# Patient Record
Sex: FEMALE | Race: WHITE | NOT HISPANIC OR LATINO | Employment: FULL TIME | URBAN - NONMETROPOLITAN AREA
[De-identification: names, ages, dates, MRNs, and addresses within clinical notes are randomized per-mention and may not be internally consistent; named-entity substitution may affect disease eponyms.]

---

## 2017-06-02 ENCOUNTER — HOSPITAL ENCOUNTER (INPATIENT)
Facility: HOSPITAL | Age: 57
LOS: 1 days | Discharge: HOME | End: 2017-06-03
Attending: FAMILY MEDICINE | Admitting: FAMILY MEDICINE

## 2017-06-02 ENCOUNTER — APPOINTMENT (OUTPATIENT)
Dept: CT IMAGING | Facility: HOSPITAL | Age: 57
End: 2017-06-02

## 2017-06-02 ENCOUNTER — APPOINTMENT (OUTPATIENT)
Dept: GENERAL RADIOLOGY | Facility: HOSPITAL | Age: 57
End: 2017-06-02

## 2017-06-02 PROBLEM — T40.2X1A OPIOID OVERDOSE (HCC): Status: ACTIVE | Noted: 2017-06-02

## 2017-06-02 LAB
ACETONE BLD QL: NEGATIVE
ALBUMIN SERPL-MCNC: 3.7 G/DL (ref 3.5–5)
ALBUMIN/GLOB SERPL: 1.2 G/DL (ref 1.1–2.5)
ALP SERPL-CCNC: 102 U/L (ref 24–120)
ALT SERPL W P-5'-P-CCNC: 30 U/L (ref 0–54)
ANION GAP SERPL CALCULATED.3IONS-SCNC: 11 MMOL/L (ref 4–13)
APAP SERPL-MCNC: <10 MCG/ML (ref 10–30)
AST SERPL-CCNC: 24 U/L (ref 7–45)
BASOPHILS # BLD AUTO: 0.03 10*3/MM3 (ref 0–0.2)
BASOPHILS NFR BLD AUTO: 0.3 % (ref 0–2)
BILIRUB SERPL-MCNC: 0.5 MG/DL (ref 0.1–1)
BUN BLD-MCNC: 13 MG/DL (ref 5–21)
BUN/CREAT SERPL: 23.6 (ref 7–25)
CALCIUM SPEC-SCNC: 8.9 MG/DL (ref 8.4–10.4)
CHLORIDE SERPL-SCNC: 103 MMOL/L (ref 98–110)
CO2 SERPL-SCNC: 28 MMOL/L (ref 24–31)
CREAT BLD-MCNC: 0.55 MG/DL (ref 0.5–1.4)
D-LACTATE SERPL-SCNC: 1.1 MMOL/L (ref 0.5–2)
DEPRECATED RDW RBC AUTO: 41.5 FL (ref 40–54)
EOSINOPHIL # BLD AUTO: 0.06 10*3/MM3 (ref 0–0.7)
EOSINOPHIL NFR BLD AUTO: 0.5 % (ref 0–4)
ERYTHROCYTE [DISTWIDTH] IN BLOOD BY AUTOMATED COUNT: 12.8 % (ref 12–15)
GFR SERPL CREATININE-BSD FRML MDRD: 114 ML/MIN/1.73
GLOBULIN UR ELPH-MCNC: 3.2 GM/DL
GLUCOSE BLD-MCNC: 204 MG/DL (ref 70–100)
GLUCOSE BLDC GLUCOMTR-MCNC: 194 MG/DL (ref 70–130)
HCT VFR BLD AUTO: 39.3 % (ref 37–47)
HGB BLD-MCNC: 13.4 G/DL (ref 12–16)
IMM GRANULOCYTES # BLD: 0.02 10*3/MM3 (ref 0–0.03)
IMM GRANULOCYTES NFR BLD: 0.2 % (ref 0–5)
LYMPHOCYTES # BLD AUTO: 2.83 10*3/MM3 (ref 0.72–4.86)
LYMPHOCYTES NFR BLD AUTO: 25.7 % (ref 15–45)
MCH RBC QN AUTO: 30.7 PG (ref 28–32)
MCHC RBC AUTO-ENTMCNC: 34.1 G/DL (ref 33–36)
MCV RBC AUTO: 89.9 FL (ref 82–98)
MONOCYTES # BLD AUTO: 0.48 10*3/MM3 (ref 0.19–1.3)
MONOCYTES NFR BLD AUTO: 4.4 % (ref 4–12)
NEUTROPHILS # BLD AUTO: 7.6 10*3/MM3 (ref 1.87–8.4)
NEUTROPHILS NFR BLD AUTO: 68.9 % (ref 39–78)
PLATELET # BLD AUTO: 217 10*3/MM3 (ref 130–400)
PMV BLD AUTO: 11.4 FL (ref 6–12)
POTASSIUM BLD-SCNC: 3.8 MMOL/L (ref 3.5–5.3)
PROT SERPL-MCNC: 6.9 G/DL (ref 6.3–8.7)
RBC # BLD AUTO: 4.37 10*6/MM3 (ref 4.2–5.4)
SALICYLATES SERPL-MCNC: <1 MG/DL (ref 15–30)
SODIUM BLD-SCNC: 142 MMOL/L (ref 135–145)
TROPONIN I SERPL-MCNC: 0.03 NG/ML (ref 0–0.03)
WBC NRBC COR # BLD: 11.02 10*3/MM3 (ref 4.8–10.8)

## 2017-06-02 PROCEDURE — 80053 COMPREHEN METABOLIC PANEL: CPT | Performed by: INTERNAL MEDICINE

## 2017-06-02 PROCEDURE — 63710000001 INSULIN LISPRO (HUMAN) PER 5 UNITS: Performed by: INTERNAL MEDICINE

## 2017-06-02 PROCEDURE — 83036 HEMOGLOBIN GLYCOSYLATED A1C: CPT | Performed by: INTERNAL MEDICINE

## 2017-06-02 PROCEDURE — 80307 DRUG TEST PRSMV CHEM ANLYZR: CPT | Performed by: INTERNAL MEDICINE

## 2017-06-02 PROCEDURE — 25010000002 HYDRALAZINE PER 20 MG: Performed by: INTERNAL MEDICINE

## 2017-06-02 PROCEDURE — 83605 ASSAY OF LACTIC ACID: CPT | Performed by: INTERNAL MEDICINE

## 2017-06-02 PROCEDURE — 72125 CT NECK SPINE W/O DYE: CPT

## 2017-06-02 PROCEDURE — 82962 GLUCOSE BLOOD TEST: CPT

## 2017-06-02 PROCEDURE — 84484 ASSAY OF TROPONIN QUANT: CPT | Performed by: INTERNAL MEDICINE

## 2017-06-02 PROCEDURE — 71010 HC CHEST PA OR AP: CPT

## 2017-06-02 PROCEDURE — 70450 CT HEAD/BRAIN W/O DYE: CPT

## 2017-06-02 PROCEDURE — 85025 COMPLETE CBC W/AUTO DIFF WBC: CPT | Performed by: INTERNAL MEDICINE

## 2017-06-02 PROCEDURE — 82009 KETONE BODYS QUAL: CPT | Performed by: INTERNAL MEDICINE

## 2017-06-02 RX ORDER — SODIUM CHLORIDE 9 MG/ML
150 INJECTION, SOLUTION INTRAVENOUS CONTINUOUS
Status: DISCONTINUED | OUTPATIENT
Start: 2017-06-02 | End: 2017-06-03

## 2017-06-02 RX ORDER — NICOTINE POLACRILEX 4 MG
15 LOZENGE BUCCAL
Status: DISCONTINUED | OUTPATIENT
Start: 2017-06-02 | End: 2017-06-03 | Stop reason: HOSPADM

## 2017-06-02 RX ORDER — DEXTROSE MONOHYDRATE 25 G/50ML
25 INJECTION, SOLUTION INTRAVENOUS
Status: DISCONTINUED | OUTPATIENT
Start: 2017-06-02 | End: 2017-06-03 | Stop reason: HOSPADM

## 2017-06-02 RX ORDER — ONDANSETRON 2 MG/ML
4 INJECTION INTRAMUSCULAR; INTRAVENOUS EVERY 6 HOURS PRN
Status: DISCONTINUED | OUTPATIENT
Start: 2017-06-02 | End: 2017-06-03 | Stop reason: HOSPADM

## 2017-06-02 RX ORDER — PANTOPRAZOLE SODIUM 40 MG/10ML
40 INJECTION, POWDER, LYOPHILIZED, FOR SOLUTION INTRAVENOUS
Status: DISCONTINUED | OUTPATIENT
Start: 2017-06-03 | End: 2017-06-03

## 2017-06-02 RX ORDER — NALOXONE HYDROCHLORIDE 1 MG/ML
1 INJECTION INTRAMUSCULAR; INTRAVENOUS; SUBCUTANEOUS AS NEEDED
Status: DISCONTINUED | OUTPATIENT
Start: 2017-06-02 | End: 2017-06-03 | Stop reason: HOSPADM

## 2017-06-02 RX ORDER — HYDRALAZINE HYDROCHLORIDE 20 MG/ML
10 INJECTION INTRAMUSCULAR; INTRAVENOUS EVERY 4 HOURS PRN
Status: DISCONTINUED | OUTPATIENT
Start: 2017-06-02 | End: 2017-06-03 | Stop reason: HOSPADM

## 2017-06-02 RX ADMIN — HYDRALAZINE HYDROCHLORIDE 10 MG: 20 INJECTION INTRAMUSCULAR; INTRAVENOUS at 22:52

## 2017-06-02 RX ADMIN — INSULIN LISPRO 2 UNITS: 100 INJECTION, SOLUTION INTRAVENOUS; SUBCUTANEOUS at 23:02

## 2017-06-02 RX ADMIN — SODIUM CHLORIDE 150 ML/HR: 9 INJECTION, SOLUTION INTRAVENOUS at 22:52

## 2017-06-03 VITALS
DIASTOLIC BLOOD PRESSURE: 52 MMHG | OXYGEN SATURATION: 96 % | TEMPERATURE: 98 F | HEIGHT: 65 IN | HEART RATE: 63 BPM | SYSTOLIC BLOOD PRESSURE: 153 MMHG | RESPIRATION RATE: 16 BRPM | BODY MASS INDEX: 33.15 KG/M2 | WEIGHT: 199 LBS

## 2017-06-03 PROBLEM — E11.9 TYPE II DIABETES MELLITUS (HCC): Status: ACTIVE | Noted: 2017-06-03

## 2017-06-03 PROBLEM — I10 ESSENTIAL HYPERTENSION: Status: ACTIVE | Noted: 2017-06-03

## 2017-06-03 LAB
AMPHET+METHAMPHET UR QL: NEGATIVE
BARBITURATES UR QL SCN: NEGATIVE
BENZODIAZ UR QL SCN: NEGATIVE
BILIRUB UR QL STRIP: NEGATIVE
CANNABINOIDS SERPL QL: NEGATIVE
CLARITY UR: CLEAR
COCAINE UR QL: NEGATIVE
COLOR UR: YELLOW
GLUCOSE BLDC GLUCOMTR-MCNC: 244 MG/DL (ref 70–130)
GLUCOSE BLDC GLUCOMTR-MCNC: 287 MG/DL (ref 70–130)
GLUCOSE UR STRIP-MCNC: ABNORMAL MG/DL
HBA1C MFR BLD: 9.3 %
HGB UR QL STRIP.AUTO: NEGATIVE
KETONES UR QL STRIP: NEGATIVE
LEUKOCYTE ESTERASE UR QL STRIP.AUTO: NEGATIVE
METHADONE UR QL SCN: NEGATIVE
NITRITE UR QL STRIP: NEGATIVE
OPIATES UR QL: NEGATIVE
PCP UR QL SCN: NEGATIVE
PH UR STRIP.AUTO: 7 [PH] (ref 5–8)
PROT UR QL STRIP: NEGATIVE
SP GR UR STRIP: 1.02 (ref 1–1.03)
TROPONIN I SERPL-MCNC: 0.02 NG/ML (ref 0–0.03)
TROPONIN I SERPL-MCNC: 0.03 NG/ML (ref 0–0.03)
UROBILINOGEN UR QL STRIP: ABNORMAL

## 2017-06-03 PROCEDURE — 80307 DRUG TEST PRSMV CHEM ANLYZR: CPT | Performed by: INTERNAL MEDICINE

## 2017-06-03 PROCEDURE — 63710000001 INSULIN LISPRO (HUMAN) PER 5 UNITS: Performed by: INTERNAL MEDICINE

## 2017-06-03 PROCEDURE — 93005 ELECTROCARDIOGRAM TRACING: CPT | Performed by: FAMILY MEDICINE

## 2017-06-03 PROCEDURE — 25010000002 HYDRALAZINE PER 20 MG: Performed by: INTERNAL MEDICINE

## 2017-06-03 PROCEDURE — 82962 GLUCOSE BLOOD TEST: CPT

## 2017-06-03 PROCEDURE — 81003 URINALYSIS AUTO W/O SCOPE: CPT | Performed by: INTERNAL MEDICINE

## 2017-06-03 PROCEDURE — 93010 ELECTROCARDIOGRAM REPORT: CPT | Performed by: INTERNAL MEDICINE

## 2017-06-03 PROCEDURE — 25010000002 THIAMINE PER 100 MG: Performed by: INTERNAL MEDICINE

## 2017-06-03 PROCEDURE — 25010000002 ENOXAPARIN PER 10 MG: Performed by: FAMILY MEDICINE

## 2017-06-03 PROCEDURE — 84484 ASSAY OF TROPONIN QUANT: CPT | Performed by: INTERNAL MEDICINE

## 2017-06-03 PROCEDURE — 84484 ASSAY OF TROPONIN QUANT: CPT | Performed by: FAMILY MEDICINE

## 2017-06-03 RX ORDER — NITROGLYCERIN 0.4 MG/1
0.4 TABLET SUBLINGUAL
Status: DISCONTINUED | OUTPATIENT
Start: 2017-06-03 | End: 2017-06-03 | Stop reason: HOSPADM

## 2017-06-03 RX ORDER — PANTOPRAZOLE SODIUM 40 MG/1
40 TABLET, DELAYED RELEASE ORAL
Status: DISCONTINUED | OUTPATIENT
Start: 2017-06-04 | End: 2017-06-03 | Stop reason: HOSPADM

## 2017-06-03 RX ORDER — NITROGLYCERIN 0.4 MG/1
TABLET SUBLINGUAL
Status: COMPLETED
Start: 2017-06-03 | End: 2017-06-03

## 2017-06-03 RX ORDER — ACETAMINOPHEN 325 MG/1
650 TABLET ORAL EVERY 6 HOURS PRN
Status: DISCONTINUED | OUTPATIENT
Start: 2017-06-03 | End: 2017-06-03 | Stop reason: HOSPADM

## 2017-06-03 RX ORDER — METOPROLOL TARTRATE 50 MG/1
50 TABLET, FILM COATED ORAL EVERY 12 HOURS SCHEDULED
Qty: 60 TABLET | Refills: 0 | Status: SHIPPED | OUTPATIENT
Start: 2017-06-03

## 2017-06-03 RX ORDER — METOPROLOL TARTRATE 50 MG/1
50 TABLET, FILM COATED ORAL EVERY 12 HOURS SCHEDULED
Status: DISCONTINUED | OUTPATIENT
Start: 2017-06-03 | End: 2017-06-03 | Stop reason: HOSPADM

## 2017-06-03 RX ORDER — KETOROLAC TROMETHAMINE 30 MG/ML
30 INJECTION, SOLUTION INTRAMUSCULAR; INTRAVENOUS EVERY 6 HOURS PRN
Status: DISCONTINUED | OUTPATIENT
Start: 2017-06-03 | End: 2017-06-03 | Stop reason: HOSPADM

## 2017-06-03 RX ADMIN — SODIUM CHLORIDE 150 ML/HR: 9 INJECTION, SOLUTION INTRAVENOUS at 05:50

## 2017-06-03 RX ADMIN — ENOXAPARIN SODIUM 40 MG: 40 INJECTION SUBCUTANEOUS at 10:35

## 2017-06-03 RX ADMIN — NITROGLYCERIN: 0.4 TABLET SUBLINGUAL at 10:09

## 2017-06-03 RX ADMIN — HYDRALAZINE HYDROCHLORIDE 10 MG: 20 INJECTION INTRAMUSCULAR; INTRAVENOUS at 07:55

## 2017-06-03 RX ADMIN — INSULIN LISPRO 3 UNITS: 100 INJECTION, SOLUTION INTRAVENOUS; SUBCUTANEOUS at 08:08

## 2017-06-03 RX ADMIN — THIAMINE HYDROCHLORIDE 100 MG: 100 INJECTION, SOLUTION INTRAMUSCULAR; INTRAVENOUS at 08:00

## 2017-06-03 RX ADMIN — INSULIN LISPRO 4 UNITS: 100 INJECTION, SOLUTION INTRAVENOUS; SUBCUTANEOUS at 12:02

## 2017-06-03 RX ADMIN — NICARDIPINE HYDROCHLORIDE 5 MG/HR: 25 INJECTION INTRAVENOUS at 04:05

## 2017-06-03 RX ADMIN — PANTOPRAZOLE SODIUM 40 MG: 40 INJECTION, POWDER, FOR SOLUTION INTRAVENOUS at 05:24

## 2017-06-03 RX ADMIN — METFORMIN HYDROCHLORIDE 500 MG: 500 TABLET ORAL at 10:48

## 2017-06-03 RX ADMIN — ACETAMINOPHEN 650 MG: 325 TABLET ORAL at 15:19

## 2017-06-03 RX ADMIN — METOPROLOL TARTRATE 50 MG: 50 TABLET ORAL at 10:35

## 2017-06-03 RX ADMIN — FOLIC ACID 1 MG: 5 INJECTION, SOLUTION INTRAMUSCULAR; INTRAVENOUS; SUBCUTANEOUS at 08:01

## 2017-06-03 NOTE — PLAN OF CARE
Problem: Patient Care Overview (Adult)  Goal: Plan of Care Review  Outcome: Ongoing (interventions implemented as appropriate)    06/03/17 1303   Coping/Psychosocial Response Interventions   Plan Of Care Reviewed With patient;family   Patient Care Overview   Progress no change   Outcome Evaluation   Outcome Summary/Follow up Plan Cardiac/Consistent carbohydrate edu provided. cont to follow

## 2017-06-03 NOTE — PROGRESS NOTES
"    [facility] Medicine Services  INPATIENT PROGRESS NOTE    Length of Stay: 1  Date of Admission: 6/2/2017  Primary Care Physician: No Known Provider    Subjective     Chief Complaint:     Medication overdose    HPI     The patient is alert, oriented and talkative this morning.  She has been complaining of some anterior chest discomfort today.  She was given 1 nitroglycerin sublingual without relief.  A stat 12-lead EKG was ordered as well as serial cardiac enzymes.  She remains on a Cardene drip and her blood pressure is improved.  I have started her on metoprolol 50 mg twice a day and hopefully we can wean the Cardene drip and transfer the patient to the floor.  Hemoglobin A1c is elevated consistent with diabetes mellitus and I will start Glucophage 500 mg by mouth twice a day.  She states that she has been hypertensive for a great number of years but has not been treated or seen a physician for treatment.  The patient and I had long discussion regarding medication compliance, hypertension and diabetes.  Patient states that she \"does not like doctors\" and as a result has not had medical care in a number of years.      Review of Systems   Constitutional: Negative.    HENT: Negative.    Eyes: Negative.    Respiratory: Negative.    Cardiovascular: Positive for chest pain.   Gastrointestinal: Negative.    Endocrine: Negative.    Genitourinary: Negative.    Musculoskeletal: Positive for back pain.   Skin: Negative.    Allergic/Immunologic: Negative.    Neurological: Negative.    Hematological: Negative.    Psychiatric/Behavioral: Negative.         All pertinent negatives and positives are as above. All other systems have been reviewed and are negative unless otherwise stated.     Objective    Temp:  [97.5 °F (36.4 °C)-99.5 °F (37.5 °C)] 98.4 °F (36.9 °C)  Heart Rate:  [69-97] 84  Resp:  [15-22] 15  BP: (135-216)/() 142/77     Specimen:  Blood Updated:  06/02/17 2201     WBC 11.02 " (H) 10*3/mm3      RBC 4.37 10*6/mm3      Hemoglobin 13.4 g/dL      Hematocrit 39.3 %      MCV 89.9 fL      MCH 30.7 pg      MCHC 34.1 g/dL      RDW 12.8 %      RDW-SD 41.5 fl      MPV 11.4 fL      Platelets 217 10*3/mm3      Neutrophil % 68.9 %      Lymphocyte % 25.7 %      Monocyte % 4.4 %      Eosinophil % 0.5 %      Basophil % 0.3 %      Immature Grans % 0.2 %      Neutrophils, Absolute 7.60 10*3/mm3      Lymphocytes, Absolute 2.83 10*3/mm3      Monocytes, Absolute 0.48 10*3/mm3      Eosinophils, Absolute 0.06 10*3/mm3      Basophils, Absolute 0.03 10*3/mm3      Immature Grans, Absolute 0.02 10*3/mm3     Acetone [[id number]]  (Normal) Collected:  06/02/17 2151    Specimen:  Blood Updated:  06/02/17 2208     Acetone Negative    Lactic Acid, Plasma [[id number]]  (Normal) Collected:  06/02/17 2151    Specimen:  Blood Updated:  06/02/17 2212     Lactate 1.1 mmol/L     Comprehensive Metabolic Panel [[id number]]  (Abnormal) Collected:  06/02/17 2151    Specimen:  Blood Updated:  06/02/17 2213     Glucose 204 (H) mg/dL      BUN 13 mg/dL      Creatinine 0.55 mg/dL      Sodium 142 mmol/L      Potassium 3.8 mmol/L      Chloride 103 mmol/L      CO2 28.0 mmol/L      Calcium 8.9 mg/dL      Total Protein 6.9 g/dL      Albumin 3.70 g/dL      ALT (SGPT) 30 U/L      AST (SGOT) 24 U/L      Alkaline Phosphatase 102 U/L      Total Bilirubin 0.5 mg/dL      eGFR Non African Amer 114 mL/min/1.73      Globulin 3.2 gm/dL      A/G Ratio 1.2 g/dL      BUN/Creatinine Ratio 23.6     Anion Gap 11.0 mmol/L     Troponin [[id number]]  (Normal) Collected:  06/02/17 2151    Specimen:  Blood Updated:  06/02/17 2225     Troponin I 0.027 ng/mL     Acetaminophen Level [[id number]]  (Abnormal) Collected:  06/02/17 2151    Specimen:  Blood Updated:  06/02/17 2230     Acetaminophen <10.0 (L) mcg/mL     Salicylate Level [[id number]]  (Abnormal) Collected:  06/02/17 2151    Specimen:  Blood Updated:  06/02/17 2230     Salicylate <1.0 (L) mg/dL     POC Glucose  Fingerstick [[id number]]  (Abnormal) Collected:  06/02/17 2258    Specimen:  Blood Updated:  06/02/17 2309     Glucose 194 (H) mg/dL       : [id number] Eloisa Singh ID: [id number]       Troponin [[id number]]  (Normal) Collected:  06/02/17 2345    Specimen:  Blood Updated:  06/03/17 0120     Troponin I 0.033 ng/mL     Urinalysis With / Culture If Indicated [[id number]]  (Abnormal) Collected:  06/03/17 0230    Specimen:  Urine from Urine, Clean Catch Updated:  06/03/17 0237     Color, UA Yellow     Appearance, UA Clear     pH, UA 7.0     Specific Gravity, UA 1.019     Glucose, UA >=1000 mg/dL (3+) (A)     Ketones, UA Negative     Bilirubin, UA Negative     Blood, UA Negative     Protein, UA Negative     Leuk Esterase, UA Negative     Nitrite, UA Negative     Urobilinogen, UA 1.0 E.U./dL    Narrative:       Urine microscopic not indicated.    Urine Drug Screen [[id number]]  (Normal) Collected:  06/03/17 0230    Specimen:  Urine from Urine, Clean Catch Updated:  06/03/17 0303     Amphetamine Screen, Urine Negative     Barbiturates Screen, Urine Negative     Benzodiazepine Screen, Urine Negative     Cocaine Screen, Urine Negative     Methadone Screen, Urine Negative     Opiate Screen Negative     Phencyclidine (PCP), Urine Negative     THC, Screen, Urine Negative    Troponin [[id number]]  (Normal) Collected:  06/03/17 0312    Specimen:  Blood Updated:  06/03/17 0352     Troponin I 0.034 ng/mL     Hemoglobin A1c [[id number]] Collected:  06/02/17 2151    Specimen:  Blood Updated:  06/03/17 0736     Hemoglobin A1C 9.3 %     POC Glucose Fingerstick [[id number]]  (Abnormal) Collected:  06/03/17 0753    Specimen:  Blood Updated:  06/03/17 0814     Glucose 244 (H) mg/dL       : [id number] Bridget Castrorandi ID: [id number]             Imaging Results (last 24 hours)     Procedure Component Value Units Date/Time    XR Chest 1 View [[id number]] Collected:  06/02/17 2215     Updated:  06/02/17 2218    Narrative:        Comparison: None available     HISTORY:  Overdose     One-view chest: Upright frontal projection of the chest is obtained. The  lungs are clear with no evidence of acute parenchymal  consolidation. No  pleural effusion or free air is observed. The  mediastinal contours are  within normal limits.                                                                                                                         Impression:       Impression: No evidence of acute cardiopulmonary disease.  This report was finalized on 06/02/2017 22:15 by Dr. Reese Patel MD.    CT Head Without Contrast [[id number]] Collected:  06/03/17 0721     Updated:  06/03/17 0729    Narrative:       EXAMINATION: CT HEAD WO CONTRAST- 6/3/2017 7:21 AM CDT     HISTORY: Altered mental status, possible fall.     DOSE: 732 mGycm (Automatic exposure control technique was implemented in  an effort to keep the radiation dose as low as possible without  compromising image quality)     REPORT:   Multiple axial images of the head were obtained without intravenous  contrast. Reconstructed coronal and sagittal images are also reviewed.  There are no comparison studies. No evidence of intracranial hemorrhage,  mass or mass-effect is identified. The ventricles and basal cisterns  appear within normal limits. Mild cortical atrophy is present. There is  decreased attenuation in the subcortical and periventricular matter  tracts, which may represent chronic small vessel white matter ischemic  disease. There appear to be small chronic lacunar infarcts in the right  thalamus and anterior limb of the right internal capsule. Bone windows  show no skull fracture.       Impression:       Impression: No acute intracranial abnormality. Moderate chronic small  vessel white matter ischemic changes with a small chronic lacunar  infarct in the right thalamus and separate small chronic lacunar infarct  in the right internal capsule.     A preliminary report was provided by  the [facility] radiology service. Note:  The preliminary report does not describe the chronic ischemic changes.                 This report was finalized on 06/03/2017 07:26 by Dr. Lamont Capps MD.    CT Cervical Spine Without Contrast [[id number]] Collected:  06/03/17 0726     Updated:  06/03/17 0732    Narrative:       EXAMINATION: CT CERVICAL SPINE WO CONTRAST- 6/3/2017 7:26 AM CDT     HISTORY: Altered mental status, possible fall. Neck pain.     DOSE: 340 mGycm (Automatic exposure control technique was implemented in  an effort to keep the radiation dose as low as possible without  compromising image quality)     REPORT: Spiral CT of the cervical spine was performed, without contrast.  Reconstructed coronal and axial images were also reviewed. There are no  comparison studies.     The sagittal images demonstrate straightening of the cervical curvature  without focal malalignment. 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  Heart spurs are seen posterior to C2-3 and C7-T1. No acute fractures  identified. The prevertebral soft tissues are within normal limits.  There is mild congenital canal stenosis at C4-5. Moderate facet  degeneration and spurring is identified on the left at T3-4. Osteophytes  contribute to mild central canal stenosis and left-sided neuroforaminal  narrowing at C2-3. There is moderate left-sided neuroforaminal narrowing  at C4-5 related to facet overgrowth and uncinate spurs. There is mild to  moderate left-sided neuroforaminal narrowing at T1-2 related to spurs.  Visualized lungs are clear. Soft tissue windows show no definite disc  herniations. MRI is more sensitive.       Impression:       No fracture or acute findings. 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 There are moderate degenerative changes as detailed  above.     A preliminary report was provided by the [facility] radiology service.           This report was finalized on 06/03/2017 07:29 by Dr. Lamont Capps MD.              Intake/Output Summary (Last 24 hours) at 06/03/17 1039  Last data filed at 06/03/17 0900   Gross per 24 hour   Intake             2774 ml   Output             2450 ml   Net              324 ml       Physical Exam   Constitutional: She is oriented to person, place, and time. She appears well-developed and well-nourished. No distress.   HENT:   Head: Atraumatic.   Nose: Nose normal.   Mouth/Throat: Oropharynx is clear and moist.   Eyes: Conjunctivae are normal. No scleral icterus.   Neck: Normal range of motion. Neck supple. No JVD present. No tracheal deviation present. No thyromegaly present.   Cardiovascular: Normal rate, regular rhythm and intact distal pulses.    Murmur heard.   Systolic murmur is present with a grade of 4/6   Pulmonary/Chest: Effort normal and breath sounds normal. No respiratory distress. She has no wheezes.   Abdominal: Soft. Bowel sounds are normal. She exhibits no distension. There is no tenderness.   Musculoskeletal: Normal range of motion. She exhibits no edema or tenderness.   Neurological: She is alert and oriented to person, place, and time.   Psychiatric: She has a normal mood and affect. Her behavior is normal. Judgment and thought content normal.             Results Review:  I have reviewed the labs, radiology results, and diagnostic studies since my last progress note and made treatment changes reflective of the results.   I have reviewed the current medications.    Assessment/Plan     Hospital Problem List     * (Principal)Opioid overdose    Type II diabetes mellitus    Essential hypertension          PLAN:    Discontinue IV fluids  Serial troponins ×3  Nitroglycerin sublingual every 5 minutes when necessary for chest pain  Metoprolol 50 mg 1 by mouth twice a day  Attempt to wean Cardene drip after starting metoprolol    Santos Reis,    06/03/17     Approximately 40 minutes of critical care time were spent managing the patient exclusive of billable procedures.      10:39 AM

## 2017-06-03 NOTE — PROGRESS NOTES
Continued Stay Note   [facility]     Patient Name: Sri Smith  MRN: [id number]  Today's Date: 6/3/2017    Admit Date: 6/2/2017          Discharge Plan       06/03/17 1608    Case Management/Social Work Plan    Plan Marina HERNANDEZ called to see if Metformin and Metroprolol are on the $4 list at [facility].   checked and both are.  MARCELLE Vanegas.    Patient/Family In Agreement With Plan yes              Discharge Codes     None        Expected Discharge Date and Time     Expected Discharge Date Expected Discharge Time    Shant 3, 2017             MARCELLE Phillips

## 2017-06-03 NOTE — H&P
History is obtained from the patient with whom I spoke, family at bedside, endorsement from Dr. Huntley who accepted the patient and transfer notes. There are no old records by which to corroborate the history.      The patient was seen at approximately 9:07 p.m. on 06/02/2017.     CHIEF COMPLAINT: When asked why she is here she states, “Hell, I don’t know.”    HISTORY OF PRESENT ILLNESS: The patient is a 57-year-old white female who does not go to the doctor. She was a CNA for several years. She denies having any medical problems but does have chronic back pain.     Today she was very sleepy and so she was left in the hot car for  about an hour; when they came out she was on her knees with scrapes. The last thing that she remembers is a relative kept saying, “Trina, wake up.” Obviously, family was concerned and they rushed her to the outlying hospital at [facility].     Of concern, she had difficulty waking up. She received a dose of Narcan and this woke her up within minutes. She was pulling off the leads, yelling “I want to go.” At times she was swatting at the air saying she does not want it, in regards to certain treatments. At times she was very agitated and uncooperative, and was refusing to answer questions by the physicians and reported “I don’t care” as every response. She was shaking her legs nervously in the bed.    The patient did have a positive toxicology screen for opiates, although she is not prescribed opiates. She later, in confidence, tells me and the nursing staff that she gets it off the street sometimes. She ultimately required 3 doses of Narcan to stabilize her to her current state.     Furthermore, she had elevated glucose in the 200s although she does not have a history of diabetes. Blood pressure was elevated and required a Cardene drip and she is 190s systolic here.      She denies having any fever, chest pain, shortness of breath. She has unintentionally lost 50 pounds  over some time. Her family said her muscles were “tripping out.”     PAST MEDICAL HISTORY: Chronic back pain.     FAMILY HISTORY: Diabetes, stroke and heart.    SOCIAL HISTORY: The patient admits to smoking 1.5 packs a day. Denies any alcohol or drug abuse. After the family left the room, she did indicate that she was buying narcotics off the street.     HOME MEDICATIONS: None.     REVIEW OF SYSTEMS: Otherwise, complete review of systems is reviewed and negative except as mentioned in the HPI.     PHYSICAL EXAMINATION:  VITAL SIGNS: Temperature 99.5, pulse 76, blood pressure is 190/84, saturation 100%.   GENERAL: The patient is awake, alert, in no distress. She is smiling and pleasant with me, although sarcastic at times. She does answer my questions, however, and is not agitated.   HEENT: No scleral icterus. Normal pinnae and hearing. Slightly dry mucosa.   NECK: No JVD or retractions.   HEART: S1, S2. No murmur, gallop, or rub.   LUNGS: Clear to auscultation bilaterally, normal respiratory effort.   ABDOMEN: Soft, nontender, nondistended. Positive bowel sounds. No mass, hepatosplenomegaly, rebound or hernia.  EXTREMITIES: No edema.   MUSCULOSKELETAL: Normal strength and tone in all 4 extremities.   DERMATOLOGIC: No rash, diaphoresis, or jaundice.   NEUROLOGIC: Normal cranial nerves. Olfactory nerve not assessed. Normal symmetric reflexes bilaterally in the biceps and patellar reflexes.   PSYCHIATRIC: The patient knows who she is and where she is. She thinks it is 06/03/2017. She is joking and inappropriately sarcastic, and does not seem to recognize the seriousness of the situation. However, she does cooperate with exam and does answer my questions.     DIAGNOSTIC DATA: Labs at the [facility]: Toxicology screen positive for opiates. CT scan, and this is a communication log, showed white matter changes, lacunar infarct and acute sinusitis. The disk that was provided is not able to be opened at this time for  review. EKG showed, per my interpretation, sinus rhythm, 88 beats per minute, T wave inversion in leads I and aVL. White count 11.2, hemoglobin 14.3, platelets are 272,000 with an unremarkable differential. PTT 24.1. INR 1.0. Salicylate level is negative at 2.6. Tylenol level was negative at 1.5. Sodium 143, potassium 3.8, chloride 104, bicarbonate 24.6, anion gap of 15. AST was 19, ALT 31. Albumin 3.8. Glucose 225. Alcohol level was negative. CK-MB was 0.9. CPK 66, . Troponin was negative. Magnesium was 2. Apparently, as discussed earlier, the patient had a CT scan of her head and chest x-ray but those are not available for review because the disk would not open for this physician.     ASSESSMENT AND PLAN:  1.  Altered mental status. What I think, given the events, what happened was the patient has underlying diabetes and hypertension which has not yet been diagnosed until today as she has not gone to the doctor. That, in combination with illicit surreptitious use of opiates bought off the street and sitting in the warm car was a recipe for disaster.     Thankfully, the patient did well after IV Narcan x3. She is now quite stable, but rather flippant and sarcastic at times and does not seem to recognize the weight of what has happened to her.     For now, we are going to repeat routine labs with CBC, CMP, lactic, chest x-ray, urinalysis. We do not know if she fell or not, so we will do another CT of her head since I cannot open the disk and we will go ahead and get the cervical spine while we are at it. Her EKG was slightly abnormal (it looked more like a block), but nonetheless will check troponins on her. Recheck a Tylenol and salicylate level to ensure that this is completely cleared out of her system, if she in fact did take any of these things.     She denies any alcohol and her alcohol level was negative, but will put her on thiamine and folate.     Additionally, I suspect she does have diabetes as  evidenced by her hyperglycemia. Obviously we will check her electrolytes, put her on sliding scale insulin. While we are at it we will check acetone level since she had a prolonged anion gap at the other facility.     2.  Hypertension, uncontrolled. For now, I will give IV hydralazine p.r.n. I will allow permissive hypertension to keep her around 170 (I suspect she runs pretty high anyway) until/if/when we definitively rule out a stroke. I do not think that this was a stroke, I think this is more narcotic-induced but nonetheless we will monitor for stroke. Treat elevated blood pressures with hydralazine, do neurological checks, check NIH scale and TOR-BSST; probably will obtain an MRI in the morning, defer to daytime hospitalist.     As I was looking through the papers with the patient and the family, and it definitively came to my attention that she in fact did have opiates in her system (although she says she was not taking any medications). I asked her frankly in front of the family if she wanted me to discuss her lab findings with her in front of them and she indicated that she wanted me to talk to her in confidence, so I excused the family who were very cooperative to comply. I explained the findings with the patient and told her what I thought had happened which was she has undiagnosed hypertension and diabetes, and this with taking the medications and sitting out in the sun led to her current presentation. I informed her that I think that she is quite stable, but more workup will be forthcoming. She did cheikh me permission, however, to let her family know that she was stable and that she will be discussing the findings with them moving forward.     I did go speak with the family in the ICU waiting area and told them that she was quite stable, but that per her request that I not disclose the discoveries of her workup and they voiced understanding of that.     After speaking with the family, I went back to  check on the patient.  She was sleepy but was able to wake up to she seemed a little groggy.  However, she did admit to me that she took four Percocet 5 tablet obtained off the street.  She indicates that she was not trying to hurt herself.  Nonetheless, as she has been a confused and unreliable historian, I will place her on a 72 hour hold until we sort out this issue definitively. Prn Narcan for groginess.            cc:          Coreen Renteria M.D.  /[id number]  D:  06/02/2017 22:27:07(Eastern Time)  T:  06/02/2017 23:50:37(Eastern Time)  Voice ID:  [id number]/Document ID:  [id number]

## 2017-06-03 NOTE — DISCHARGE SUMMARY
[facility] Medicine Services  DISCHARGE SUMMARY       Date of Admission: 6/2/2017  Date of Discharge:  6/3/2017  Primary Care Physician: No Known Provider    Discharge Diagnoses:  Patient Active Problem List   Diagnosis   • Opioid overdose   • Type II diabetes mellitus   • Essential hypertension         Presenting Problem/History of Present Illness:  Opioid overdose [T40.2X1A]     Chief Complaint on Day of Discharge:   None    History of Present Illness on Day of Discharge:   The patient is back to her typical baseline.  There are no residual neurologic deficits.  She is anxious for discharge home    Hospital Course  Patient is a 57 y.o. female presented with an unintentional overdose of narcotic pain medication.    The patient is a 57-year-old white female who does not go to the doctor. She was a CNA for several years. She denies having any medical problems but does have chronic back pain.    Today she was very sleepy and so she was left in the hot car for about an hour; when they came out she was on her knees with scrapes. The last thing that she remembers is a relative kept saying, “Trina, wake up.” Obviously, family was concerned and they rushed her to [facility]    Of concern, she had difficulty waking up. She received a dose of Narcan and this woke her up within minutes. She was pulling off the leads, yelling “I want to go.” At times she was swatting at the air saying she does not want it, in regards to certain treatments. At times she was very agitated and uncooperative, and was refusing to answer questions by the physicians and reported “I don’t care” as every response. She was shaking her legs nervously in the bed.   The patient did have a positive toxicology screen for opiates, although she is not prescribed opiates. She later, in confidence, tells me and the nursing staff that she gets it off the street sometimes. She ultimately  required 3 doses of Narcan to stabilize her to her current state.    Furthermore, she had elevated glucose in the 200s although she does not have a history of diabetes. Blood pressure was elevated and required a Cardene drip and she is 190s systolic here.     She denies having any fever, chest pain, shortness of breath. She has unintentionally lost 50 pounds over some time. Her family said her muscles were “tripping out.”   ASSESSMENT AND PLAN ON ADMISSION PER RASHAWNIST:  1. Altered mental status. What I think, given the events, what happened was the patient has underlying diabetes and hypertension which has not yet been diagnosed until today as she has not gone to the doctor. That, in combination with illicit surreptitious use of opiates bought off the street and sitting in the warm car was a recipe for disaster.    Thankfully, the patient did well after IV Narcan x3. She is now quite stable, but rather flippant and sarcastic at times and does not seem to recognize the weight of what has happened to her.    For now, we are going to repeat routine labs with CBC, CMP, lactic, chest x-ray, urinalysis. We do not know if she fell or not, so we will do another CT of her head since I cannot open the disk and we will go ahead and get the cervical spine while we are at it. Her EKG was slightly abnormal (it looked more like a block), but nonetheless will check troponins on her. Recheck a Tylenol and salicylate level to ensure that this is completely cleared out of her system, if she in fact did take any of these things.    She denies any alcohol and her alcohol level was negative, but will put her on thiamine and folate.    Additionally, I suspect she does have diabetes as evidenced by her hyperglycemia. Obviously we will check her electrolytes, put her on sliding scale insulin. While we are at it we will check acetone level since she had a prolonged anion gap at the other facility.    2. Hypertension, uncontrolled. For  now, I will give IV hydralazine p.r.n. I will allow permissive hypertension to keep her around 170 (I suspect she runs pretty high anyway) until/if/when we definitively rule out a stroke. I do not think that this was a stroke, I think this is more narcotic-induced but nonetheless we will monitor for stroke. Treat elevated blood pressures with hydralazine, do neurological checks, check NIH scale and TOR-BSST; probably will obtain an MRI in the morning, defer to daytime hospitalist.    As I was looking through the papers with the patient and the family, and it definitively came to my attention that she in fact did have opiates in her system (although she says she was not taking any medications). I asked her frankly in front of the family if she wanted me to discuss her lab findings with her in front of them and she indicated that she wanted me to talk to her in confidence, so I excused the family who were very cooperative to comply. I explained the findings with the patient and told her what I thought had happened which was she has undiagnosed hypertension and diabetes, and this with taking the medications and sitting out in the sun led to her current presentation. I informed her that I think that she is quite stable, but more workup will be forthcoming. She did cheikh me permission, however, to let her family know that she was stable and that she will be discussing the findings with them moving forward.    I did go speak with the family in the ICU waiting area and told them that she was quite stable, but that per her request that I not disclose the discoveries of her workup and they voiced understanding of that.    After speaking with the family, I went back to check on the patient. She was sleepy but was able to wake up to she seemed a little groggy. However, she did admit to me that she took four Percocet 5 tablet obtained off the street. She indicates that she was not trying to hurt herself. Nonetheless, as she has  been a confused and unreliable historian, I will place her on a 72 hour hold until we sort out this issue definitively. Prn Narcan for groginess.    Upon evaluating the patient this morning she appears to be alert oriented and at baseline.  She is anxious for discharge home.  The patient's memory and judgment appear to be intact.  Blood pressure was elevated and she was on a Cardene drip.  While in the intensive care unit the patient experienced substernal chest discomfort and serial troponins were obtained and were within normal limits.  Patient was placed on oral beta blocker and started on oral metformin.  Cardene was successfully weaned and the patient was transferred to the medical/surgical floor.  I rechecked on the patient later on in the day and her blood pressure was well controlled with the oral medications.  She experienced no further chest discomfort during her hospital stay.  The patient was given instructions to follow up with a primary care physician in one week for reassessment of her blood pressure and blood sugars and she agrees to do so.  This was discussed in her presence and in the presence of 2 of her family members.  The patient was felt to be appropriate for discharge home on metformin and metoprolol.      Pertinent Test Results:    Specimen:  Blood Updated:  06/02/17 2201     WBC 11.02 (H) 10*3/mm3      RBC 4.37 10*6/mm3      Hemoglobin 13.4 g/dL      Hematocrit 39.3 %      MCV 89.9 fL      MCH 30.7 pg      MCHC 34.1 g/dL      RDW 12.8 %      RDW-SD 41.5 fl      MPV 11.4 fL      Platelets 217 10*3/mm3      Neutrophil % 68.9 %      Lymphocyte % 25.7 %      Monocyte % 4.4 %      Eosinophil % 0.5 %      Basophil % 0.3 %      Immature Grans % 0.2 %      Neutrophils, Absolute 7.60 10*3/mm3      Lymphocytes, Absolute 2.83 10*3/mm3      Monocytes, Absolute 0.48 10*3/mm3      Eosinophils, Absolute 0.06 10*3/mm3      Basophils, Absolute 0.03 10*3/mm3      Immature Grans, Absolute 0.02 10*3/mm3      Acetone [[id number]]  (Normal) Collected:  06/02/17 2151    Specimen:  Blood Updated:  06/02/17 2208     Acetone Negative    Lactic Acid, Plasma [[id number]]  (Normal) Collected:  06/02/17 2151    Specimen:  Blood Updated:  06/02/17 2212     Lactate 1.1 mmol/L     Comprehensive Metabolic Panel [[id number]]  (Abnormal) Collected:  06/02/17 2151    Specimen:  Blood Updated:  06/02/17 2213     Glucose 204 (H) mg/dL      BUN 13 mg/dL      Creatinine 0.55 mg/dL      Sodium 142 mmol/L      Potassium 3.8 mmol/L      Chloride 103 mmol/L      CO2 28.0 mmol/L      Calcium 8.9 mg/dL      Total Protein 6.9 g/dL      Albumin 3.70 g/dL      ALT (SGPT) 30 U/L      AST (SGOT) 24 U/L      Alkaline Phosphatase 102 U/L      Total Bilirubin 0.5 mg/dL      eGFR Non African Amer 114 mL/min/1.73      Globulin 3.2 gm/dL      A/G Ratio 1.2 g/dL      BUN/Creatinine Ratio 23.6     Anion Gap 11.0 mmol/L     Troponin [[id number]]  (Normal) Collected:  06/02/17 2151    Specimen:  Blood Updated:  06/02/17 2225     Troponin I 0.027 ng/mL     Acetaminophen Level [[id number]]  (Abnormal) Collected:  06/02/17 2151    Specimen:  Blood Updated:  06/02/17 2230     Acetaminophen <10.0 (L) mcg/mL     Salicylate Level [[id number]]  (Abnormal) Collected:  06/02/17 2151    Specimen:  Blood Updated:  06/02/17 2230     Salicylate <1.0 (L) mg/dL     POC Glucose Fingerstick [[id number]]  (Abnormal) Collected:  06/02/17 2258    Specimen:  Blood Updated:  06/02/17 2309     Glucose 194 (H) mg/dL       : [id number] Eloisa Singh ID: [id number]       Troponin [[id number]]  (Normal) Collected:  06/02/17 2345    Specimen:  Blood Updated:  06/03/17 0120     Troponin I 0.033 ng/mL     Urinalysis With / Culture If Indicated [[id number]]  (Abnormal) Collected:  06/03/17 0230    Specimen:  Urine from Urine, Clean Catch Updated:  06/03/17 0237     Color, UA Yellow     Appearance, UA Clear     pH, UA 7.0     Specific Gravity, UA 1.019     Glucose, UA >=1000 mg/dL (3+) (A)      Ketones, UA Negative     Bilirubin, UA Negative     Blood, UA Negative     Protein, UA Negative     Leuk Esterase, UA Negative     Nitrite, UA Negative     Urobilinogen, UA 1.0 E.U./dL    Narrative:       Urine microscopic not indicated.    Urine Drug Screen [[id number]]  (Normal) Collected:  06/03/17 0230    Specimen:  Urine from Urine, Clean Catch Updated:  06/03/17 0303     Amphetamine Screen, Urine Negative     Barbiturates Screen, Urine Negative     Benzodiazepine Screen, Urine Negative     Cocaine Screen, Urine Negative     Methadone Screen, Urine Negative     Opiate Screen Negative     Phencyclidine (PCP), Urine Negative     THC, Screen, Urine Negative    Troponin [[id number]]  (Normal) Collected:  06/03/17 0312    Specimen:  Blood Updated:  06/03/17 0352     Troponin I 0.034 ng/mL     Hemoglobin A1c [[id number]] Collected:  06/02/17 2151    Specimen:  Blood Updated:  06/03/17 0736     Hemoglobin A1C 9.3 %     POC Glucose Fingerstick [[id number]]  (Abnormal) Collected:  06/03/17 0753    Specimen:  Blood Updated:  06/03/17 0814     Glucose 244 (H) mg/dL       : [id number] [facility]: [id number]       Troponin [[id number]]  (Normal) Collected:  06/03/17 1024    Specimen:  Blood Updated:  06/03/17 1054     Troponin I 0.021 ng/mL     POC Glucose Fingerstick [[id number]]  (Abnormal) Collected:  06/03/17 1146    Specimen:  Blood Updated:  06/03/17 1157     Glucose 287 (H) mg/dL       : [id number] Christel SarmientoaMeter ID: [id number]       Troponin [[id number]]  (Normal) Collected:  06/03/17 1320    Specimen:  Blood Updated:  06/03/17 1400     Troponin I 0.025 ng/mL         Imaging Results (last 7 days)     Procedure Component Value Units Date/Time    XR Chest 1 View [[id number]] Collected:  06/02/17 2215     Updated:  06/02/17 2218    Narrative:       Comparison: None available     HISTORY:  Overdose     One-view chest: Upright frontal projection of the chest is obtained. The  lungs are clear with no  evidence of acute parenchymal  consolidation. No  pleural effusion or free air is observed. The  mediastinal contours are  within normal limits.                                                                                                                         Impression:       Impression: No evidence of acute cardiopulmonary disease.  This report was finalized on 06/02/2017 22:15 by Dr. Reese Patel MD.    CT Head Without Contrast [[id number]] Collected:  06/03/17 0721     Updated:  06/03/17 0729    Narrative:       EXAMINATION: CT HEAD WO CONTRAST- 6/3/2017 7:21 AM CDT     HISTORY: Altered mental status, possible fall.     DOSE: 732 mGycm (Automatic exposure control technique was implemented in  an effort to keep the radiation dose as low as possible without  compromising image quality)     REPORT:   Multiple axial images of the head were obtained without intravenous  contrast. Reconstructed coronal and sagittal images are also reviewed.  There are no comparison studies. No evidence of intracranial hemorrhage,  mass or mass-effect is identified. The ventricles and basal cisterns  appear within normal limits. Mild cortical atrophy is present. There is  decreased attenuation in the subcortical and periventricular matter  tracts, which may represent chronic small vessel white matter ischemic  disease. There appear to be small chronic lacunar infarcts in the right  thalamus and anterior limb of the right internal capsule. Bone windows  show no skull fracture.       Impression:       Impression: No acute intracranial abnormality. Moderate chronic small  vessel white matter ischemic changes with a small chronic lacunar  infarct in the right thalamus and separate small chronic lacunar infarct  in the right internal capsule.     A preliminary report was provided by the [facility] radiology service. Note:  The preliminary report does not describe the chronic ischemic changes.                 This report was finalized on  06/03/2017 07:26 by Dr. Lamont Capps MD.    CT Cervical Spine Without Contrast [[id number]] Collected:  06/03/17 0726     Updated:  06/03/17 0732    Narrative:       EXAMINATION: CT CERVICAL SPINE WO CONTRAST- 6/3/2017 7:26 AM CDT     HISTORY: Altered mental status, possible fall. Neck pain.     DOSE: 340 mGycm (Automatic exposure control technique was implemented in  an effort to keep the radiation dose as low as possible without  compromising image quality)     REPORT: Spiral CT of the cervical spine was performed, without contrast.  Reconstructed coronal and axial images were also reviewed. There are no  comparison studies.     The sagittal images demonstrate straightening of the cervical curvature  without focal malalignment. 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  Heart spurs are seen posterior to C2-3 and C7-T1. No acute fractures  identified. The prevertebral soft tissues are within normal limits.  There is mild congenital canal stenosis at C4-5. Moderate facet  degeneration and spurring is identified on the left at T3-4. Osteophytes  contribute to mild central canal stenosis and left-sided neuroforaminal  narrowing at C2-3. There is moderate left-sided neuroforaminal narrowing  at C4-5 related to facet overgrowth and uncinate spurs. There is mild to  moderate left-sided neuroforaminal narrowing at T1-2 related to spurs.  Visualized lungs are clear. Soft tissue windows show no definite disc  herniations. MRI is more sensitive.       Impression:       No fracture or acute findings. 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 There are moderate degenerative changes as detailed  above.     A preliminary report was provided by the [facility] radiology service.           This report was finalized on 06/03/2017 07:29 by Dr. Lamont Capps MD.            Condition on Discharge:    Stable     Physical Exam on Discharge:  /40 (BP Location: Right arm, Patient Position: Sitting)  Pulse 72  Temp 99.4 °F (37.4  "°C) (Temporal Artery )   Resp 16  Ht 65\" (165.1 cm)  Wt 199 lb (90.3 kg)  SpO2 96%  BMI 33.12 kg/m2  Physical Exam  Constitutional: She is oriented to person, place, and time. She appears well-developed and well-nourished. No distress.   HENT:   Head: Atraumatic.   Nose: Nose normal.   Mouth/Throat: Oropharynx is clear and moist.   Eyes: Conjunctivae are normal. No scleral icterus.   Neck: Normal range of motion. Neck supple. No JVD present. No tracheal deviation present. No thyromegaly present.   Cardiovascular: Normal rate, regular rhythm and intact distal pulses.   Murmur heard.  Systolic murmur is present with a grade of 4/6   Pulmonary/Chest: Effort normal and breath sounds normal. No respiratory distress. She has no wheezes.   Abdominal: Soft. Bowel sounds are normal. She exhibits no distension. There is no tenderness.   Musculoskeletal: Normal range of motion. She exhibits no edema or tenderness.   Neurological: She is alert and oriented to person, place, and time.   Psychiatric: She has a normal mood and affect. Her behavior is normal. Judgment and thought content normal.     Discharge Disposition:  Home or Self Care    Discharge Medications:   Sri Smith   Home Medication Instructions [id number]    Printed on:06/03/17 1250   Medication Information                      metFORMIN (GLUCOPHAGE) 500 MG tablet  Take 1 tablet by mouth 2 (Two) Times a Day With Meals.             metoprolol tartrate (LOPRESSOR) 50 MG tablet  Take 1 tablet by mouth Every 12 (Twelve) Hours.                 Discharge Diet:   Diet Instructions     Diet: Consistent Carbohydrate; Thin Liquids, No Restrictions       Discharge Diet:  Consistent Carbohydrate   Fluid Consistency:  Thin Liquids, No Restrictions                 Discharge Care Plan / Instructions:   Discharge home    Activity at Discharge:   Activity Instructions     Activity as Tolerated                     Follow-up Appointments:  Follow-up with primary care " physician in one to 2 weeks    Test Results Pending at Discharge:   None     Santos Reis DO  06/03/17  3:48 PM    Time: Discharge Less than 30 min    Please note that portions of this note may have been completed with a voice recognition program. Efforts were made to edit the dictations, but occasionally words are mistranscribed.

## 2017-06-03 NOTE — PROGRESS NOTES
Discharge Planning Assessment  [facility]     Patient Name: Sri Smith  MRN: [id number]  Today's Date: 6/3/2017    Admit Date: 6/2/2017          Discharge Needs Assessment       06/03/17 1144    Living Environment    Lives With child(marline), adult    Home Accessibility no concerns    Type of Financial/Environmental Concern unable to afford medication(s);no insurance   Referral has been made to [facility].    Transportation Available car;family or friend will provide    Living Environment    Provides Primary Care For no one    Quality Of Family Relationships supportive    Able to Return to Prior Living Arrangements yes    Discharge Needs Assessment    Concerns To Be Addressed no discharge needs identified    Readmission Within The Last 30 Days no previous admission in last 30 days    Anticipated Changes Related to Illness none    Equipment Currently Used at Home none    Equipment Needed After Discharge none            Discharge Plan     None        Discharge Placement     No information found                Demographic Summary     None            Functional Status     None            Psychosocial     None            Abuse/Neglect     None            Legal     None            Substance Abuse     None            Patient Forms     None          MARCELLE Phillips

## 2017-06-03 NOTE — PLAN OF CARE
Problem: Patient Care Overview (Adult)  Goal: Plan of Care Review  Outcome: Ongoing (interventions implemented as appropriate)    06/03/17 0350   Coping/Psychosocial Response Interventions   Plan Of Care Reviewed With patient   Patient Care Overview   Progress no change   Outcome Evaluation   Outcome Summary/Follow up Plan Remains hypertensive. Cardene prn BP greater than 170 mmHg. Uncomfortable with hospitalization, reluctantly cooperative with care. Neurologically intact.       Goal: Adult Individualization and Mutuality  Outcome: Ongoing (interventions implemented as appropriate)    Problem: Overdose, Ingestion/Inhalants (Adult)  Goal: Signs and Symptoms of Listed Potential Problems Will be Absent or Manageable (Overdose, Ingestion/Inhalants)  Outcome: Ongoing (interventions implemented as appropriate)    Problem: Hypertensive Disease/Crisis (Arterial) (Adult)  Goal: Signs and Symptoms of Listed Potential Problems Will be Absent or Manageable (Hypertensive Disease/Crisis)  Outcome: Ongoing (interventions implemented as appropriate)

## 2017-06-03 NOTE — PLAN OF CARE
Problem: Patient Care Overview (Adult)  Goal: Plan of Care Review  Outcome: Outcome(s) achieved Date Met:  06/03/17 06/03/17 1604   Coping/Psychosocial Response Interventions   Plan Of Care Reviewed With patient   Patient Care Overview   Progress improving       Goal: Adult Individualization and Mutuality  Outcome: Outcome(s) achieved Date Met:  06/03/17  Goal: Discharge Needs Assessment  Outcome: Outcome(s) achieved Date Met:  06/03/17    Problem: Overdose, Ingestion/Inhalants (Adult)  Goal: Signs and Symptoms of Listed Potential Problems Will be Absent or Manageable (Overdose, Ingestion/Inhalants)  Outcome: Outcome(s) achieved Date Met:  06/03/17    Problem: Hypertensive Disease/Crisis (Arterial) (Adult)  Goal: Signs and Symptoms of Listed Potential Problems Will be Absent or Manageable (Hypertensive Disease/Crisis)  Outcome: Outcome(s) achieved Date Met:  06/03/17

## 2017-06-03 NOTE — DISCHARGE INSTRUCTIONS
Patient does not have current insurance. Medicaid approval pending. Patient instructed to go to walk in clinic for follow up

## 2021-06-04 ENCOUNTER — HOSPITAL ENCOUNTER (EMERGENCY)
Age: 61
Discharge: HOME | End: 2021-06-05

## 2021-06-04 ENCOUNTER — APPOINTMENT (OUTPATIENT)
Dept: CT IMAGING | Age: 61
End: 2021-06-04

## 2021-06-04 VITALS
RESPIRATION RATE: 18 BRPM | WEIGHT: 190 LBS | SYSTOLIC BLOOD PRESSURE: 164 MMHG | HEIGHT: 65 IN | BODY MASS INDEX: 31.65 KG/M2 | HEART RATE: 84 BPM | DIASTOLIC BLOOD PRESSURE: 108 MMHG | OXYGEN SATURATION: 97 % | TEMPERATURE: 98.3 F

## 2021-06-04 DIAGNOSIS — S30.0XXA CONTUSION OF LOWER BACK, INITIAL ENCOUNTER: Primary | ICD-10-CM
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
## 2021-06-04 PROCEDURE — 6360000002 HC RX W HCPCS: Performed by: NURSE PRACTITIONER

## 2021-06-04 PROCEDURE — 90715 TDAP VACCINE 7 YRS/> IM: CPT | Performed by: NURSE PRACTITIONER

## 2021-06-04 PROCEDURE — 99283 EMERGENCY DEPT VISIT LOW MDM: CPT

## 2021-06-04 PROCEDURE — 72131 CT LUMBAR SPINE W/O DYE: CPT

## 2021-06-04 PROCEDURE — 96372 THER/PROPH/DIAG INJ SC/IM: CPT

## 2021-06-04 PROCEDURE — 90471 IMMUNIZATION ADMIN: CPT | Performed by: NURSE PRACTITIONER

## 2021-06-04 RX ORDER — ORPHENADRINE CITRATE 30 MG/ML
60 INJECTION INTRAMUSCULAR; INTRAVENOUS ONCE
Status: COMPLETED | OUTPATIENT
Start: 2021-06-04 | End: 2021-06-04

## 2021-06-04 RX ORDER — MORPHINE SULFATE 4 MG/ML
4 INJECTION, SOLUTION INTRAMUSCULAR; INTRAVENOUS ONCE
Status: COMPLETED | OUTPATIENT
Start: 2021-06-04 | End: 2021-06-04

## 2021-06-04 RX ORDER — DIPHENHYDRAMINE HCL 25 MG
50 CAPSULE ORAL NIGHTLY PRN
COMMUNITY

## 2021-06-04 RX ORDER — ACETAMINOPHEN 325 MG/1
650 TABLET ORAL EVERY 6 HOURS PRN
COMMUNITY

## 2021-06-04 RX ADMIN — MORPHINE SULFATE 4 MG: 4 INJECTION, SOLUTION INTRAMUSCULAR; INTRAVENOUS at 22:30

## 2021-06-04 RX ADMIN — ORPHENADRINE CITRATE 60 MG: 60 INJECTION INTRAMUSCULAR; INTRAVENOUS at 22:30

## 2021-06-04 RX ADMIN — TETANUS TOXOID, REDUCED DIPHTHERIA TOXOID AND ACELLULAR PERTUSSIS VACCINE, ADSORBED 0.5 ML: 5; 2.5; 8; 8; 2.5 SUSPENSION INTRAMUSCULAR at 22:34

## 2021-06-04 ASSESSMENT — PAIN DESCRIPTION - LOCATION: LOCATION: BACK

## 2021-06-04 ASSESSMENT — PAIN SCALES - GENERAL
PAINLEVEL_OUTOF10: 10
PAINLEVEL_OUTOF10: 10
PAINLEVEL_OUTOF10: 5

## 2021-06-04 ASSESSMENT — PAIN DESCRIPTION - ORIENTATION: ORIENTATION: LOWER

## 2021-06-04 ASSESSMENT — ENCOUNTER SYMPTOMS: BACK PAIN: 1

## 2021-06-04 ASSESSMENT — PAIN DESCRIPTION - DESCRIPTORS: DESCRIPTORS: SHARP

## 2021-06-05 RX ORDER — METHOCARBAMOL 500 MG/1
500 TABLET, FILM COATED ORAL 3 TIMES DAILY
Qty: 20 TABLET | Refills: 0 | Status: SHIPPED | OUTPATIENT
Start: 2021-06-05 | End: 2021-06-12

## 2021-06-05 NOTE — ED PROVIDER NOTES
 Number of children: Not on file    Years of education: Not on file    Highest education level: Not on file   Occupational History    Not on file   Tobacco Use    Smoking status: Not on file   Substance and Sexual Activity    Alcohol use: Not on file    Drug use: Not on file    Sexual activity: Not on file   Other Topics Concern    Not on file   Social History Narrative    Not on file     Social Determinants of Health     Financial Resource Strain:     Difficulty of Paying Living Expenses:    Food Insecurity:     Worried About Running Out of Food in the Last Year:     [address] in the Last Year:    Transportation Needs:     Lack of Transportation (Medical):  Lack of Transportation (Non-Medical):    Physical Activity:     Days of Exercise per Week:     Minutes of Exercise per Session:    Stress:     Feeling of Stress :    Social Connections:     Frequency of Communication with Friends and Family:     Frequency of Social Gatherings with Friends and Family:     Attends Jain Services:     Active Member of Clubs or Organizations:     Attends Club or Organization Meetings:     Marital Status:    Intimate Partner Violence:     Fear of Current or Ex-Partner:     Emotionally Abused:     Physically Abused:     Sexually Abused:        SCREENINGS    Hayden Coma Scale  Eye Opening: Spontaneous  Best Verbal Response: Oriented  Best Motor Response: Obeys commands  Farida Coma Scale Score: 15        PHYSICAL EXAM    (up to 7 for level 4, 8 or more for level 5)     ED Triage Vitals   BP Temp Temp src Pulse Resp SpO2 Height Weight   06/04/21 2047 06/04/21 2045 -- 06/04/21 2045 06/04/21 2045 06/04/21 2045 06/04/21 2045 06/04/21 2045   (!) 191/92 98.3 °F (36.8 °C)  102 18 94 % 5' 5\" (1.651 m) 190 lb (86.2 kg)       Physical Exam  Vitals reviewed. HENT:      Mouth/Throat:      Mouth: Mucous membranes are moist.      Pharynx: Oropharynx is clear.    Eyes:      Conjunctiva/sclera: Conjunctivae normal.   Neck:      Comments: Negative NEXUS c-spine criteria  Cardiovascular:      Rate and Rhythm: Normal rate and regular rhythm. Pulmonary:      Effort: Pulmonary effort is normal.      Breath sounds: Normal breath sounds. Abdominal:      General: Abdomen is flat. Tenderness: There is no abdominal tenderness. Musculoskeletal:      Cervical back: Normal range of motion. Comments: Mild diffuse ttp lower midline and bilateral lumbar paraspinal muscles   Skin:     General: Skin is warm and dry. Neurological:      Mental Status: She is alert. DIAGNOSTIC RESULTS     EKG: All EKG's are interpreted by the Emergency Department Physician who either signs or Co-signs this chart in the absence of acardiologist.        RADIOLOGY:   Non-plain film images such as CT, Ultrasound andMRI are read by the radiologist. Plain radiographic images are visualized and preliminarily interpreted by the emergency physician with the below findings:        Interpretation per the Radiologist below, if available at the time of this note:    CT Lumbar Spine WO Contrast    (Results Pending)         ED BEDSIDE ULTRASOUND:   Performed by ED Physician - none    LABS:  Labs Reviewed - No data to display    All other labs were within normal range or not returned as of this dictation. RE-ASSESSMENT     CT L SPINE:    Bones: No acute fracture or subluxation. Soft tissue: No hematoma. Spinal canal: Mild spinal canal stenosis at L1-L2 and L4-5 from degenerative changes. Moderate bilateral L5-S1 foraminal stenosis. EMERGENCY DEPARTMENT COURSE and DIFFERENTIALDIAGNOSIS/MDM:   Vitals:    Vitals:    06/04/21 2232 06/04/21 2301 06/04/21 2325 06/04/21 2326   BP: (!) 126/113   (!) 164/108   Pulse: 82  84    Resp:       Temp:       SpO2: 95% 97%     Weight:       Height:           MDM      CONSULTS:  None    PROCEDURES:  Unless otherwise notedbelow, none     Procedures    FINAL IMPRESSION     1.  Contusion of lower back, initial encounter    2. Fall, initial encounter          DISPOSITION/PLAN   DISPOSITION Decision To Discharge 06/05/2021 12:11:08 AM      PATIENT REFERRED TO:  [place] Garcia [id number] [phone]  Schedule an appointment as soon as possible for a visit in 3 days  fail to improve      DISCHARGE MEDICATIONS:       Current Discharge Medication List           Medication List      START taking these medications    methocarbamol 500 MG tablet  Commonly known as: Robaxin  Take 1 tablet by mouth 3 times daily for 7 days        ASK your doctor about these medications    acetaminophen 325 MG tablet  Commonly known as: TYLENOL     diphenhydrAMINE 25 MG capsule  Commonly known as: BENADRYL           Where to Get Your Medications      You can get these medications from any pharmacy    Bring a paper prescription for each of these medications  · methocarbamol 500 MG tablet           (Pleasenote that portions of this note were completed with a voice recognition program.  Efforts were made to edit the dictations but occasionally words are mis-transcribed.)              MINH Rodríguez  06/05/21 0021

## 2024-07-24 ENCOUNTER — TELEPHONE (OUTPATIENT)
Dept: FAMILY MEDICINE CLINIC | Age: 64
End: 2024-07-24

## 2024-07-24 NOTE — TELEPHONE ENCOUNTER
Called spouse's number listed and LVM asking for patient to arrive at 1:45 on the 26th due to being a new patient. Informed him to call back and confirm if they were able to do so.

## 2024-07-26 ENCOUNTER — OFFICE VISIT (OUTPATIENT)
Dept: FAMILY MEDICINE CLINIC | Age: 64
End: 2024-07-26

## 2024-07-26 VITALS
BODY MASS INDEX: 32.33 KG/M2 | TEMPERATURE: 96.9 F | DIASTOLIC BLOOD PRESSURE: 88 MMHG | OXYGEN SATURATION: 98 % | SYSTOLIC BLOOD PRESSURE: 152 MMHG | WEIGHT: 189.38 LBS | HEIGHT: 64 IN | HEART RATE: 86 BPM

## 2024-07-26 DIAGNOSIS — Z72.0 TOBACCO ABUSE: ICD-10-CM

## 2024-07-26 DIAGNOSIS — L08.9 STAPH SKIN INFECTION: ICD-10-CM

## 2024-07-26 DIAGNOSIS — I10 PRIMARY HYPERTENSION: ICD-10-CM

## 2024-07-26 DIAGNOSIS — R41.82 ALTERED MENTAL STATUS, UNSPECIFIED ALTERED MENTAL STATUS TYPE: ICD-10-CM

## 2024-07-26 DIAGNOSIS — E11.65 TYPE 2 DIABETES MELLITUS WITH HYPERGLYCEMIA, WITHOUT LONG-TERM CURRENT USE OF INSULIN (HCC): ICD-10-CM

## 2024-07-26 DIAGNOSIS — B95.8 STAPH SKIN INFECTION: ICD-10-CM

## 2024-07-26 DIAGNOSIS — E11.65 TYPE 2 DIABETES MELLITUS WITH HYPERGLYCEMIA, WITHOUT LONG-TERM CURRENT USE OF INSULIN (HCC): Primary | ICD-10-CM

## 2024-07-26 LAB
ALBUMIN SERPL-MCNC: 3.9 G/DL (ref 3.5–5.2)
ALP SERPL-CCNC: 136 U/L (ref 35–104)
ALT SERPL-CCNC: 14 U/L (ref 5–33)
ANION GAP SERPL CALCULATED.3IONS-SCNC: 14 MMOL/L (ref 7–19)
AST SERPL-CCNC: 14 U/L (ref 5–32)
BACTERIA URNS QL MICRO: NEGATIVE /HPF
BASOPHILS # BLD: 0.1 K/UL (ref 0–0.2)
BASOPHILS NFR BLD: 0.7 % (ref 0–1)
BILIRUB SERPL-MCNC: 0.8 MG/DL (ref 0.2–1.2)
BILIRUB UR QL STRIP: ABNORMAL
BUN SERPL-MCNC: 17 MG/DL (ref 8–23)
CALCIUM SERPL-MCNC: 8.9 MG/DL (ref 8.8–10.2)
CHLORIDE SERPL-SCNC: 102 MMOL/L (ref 98–111)
CHOLEST SERPL-MCNC: 160 MG/DL (ref 160–199)
CLARITY UR: CLEAR
CO2 SERPL-SCNC: 23 MMOL/L (ref 22–29)
COLOR UR: ABNORMAL
CREAT SERPL-MCNC: 0.7 MG/DL (ref 0.5–0.9)
CREAT UR-MCNC: 183 MG/DL (ref 28–217)
CRYSTALS URNS MICRO: ABNORMAL /HPF
EOSINOPHIL # BLD: 0.1 K/UL (ref 0–0.6)
EOSINOPHIL NFR BLD: 1.1 % (ref 0–5)
EPI CELLS #/AREA URNS AUTO: 7 /HPF (ref 0–5)
ERYTHROCYTE [DISTWIDTH] IN BLOOD BY AUTOMATED COUNT: 13.5 % (ref 11.5–14.5)
GLUCOSE SERPL-MCNC: 154 MG/DL (ref 74–109)
GLUCOSE UR STRIP.AUTO-MCNC: NEGATIVE MG/DL
HBA1C MFR BLD: 7.4 % (ref 4–6)
HCT VFR BLD AUTO: 41.4 % (ref 37–47)
HDLC SERPL-MCNC: 53 MG/DL (ref 65–121)
HGB BLD-MCNC: 13.7 G/DL (ref 12–16)
HGB UR STRIP.AUTO-MCNC: ABNORMAL MG/L
HYALINE CASTS #/AREA URNS AUTO: 7 /HPF (ref 0–8)
IMM GRANULOCYTES # BLD: 0 K/UL
KETONES UR STRIP.AUTO-MCNC: NEGATIVE MG/DL
LDLC SERPL CALC-MCNC: 89 MG/DL
LEUKOCYTE ESTERASE UR QL STRIP.AUTO: NEGATIVE
LYMPHOCYTES # BLD: 0.9 K/UL (ref 1.1–4.5)
LYMPHOCYTES NFR BLD: 12.3 % (ref 20–40)
MCH RBC QN AUTO: 31.8 PG (ref 27–31)
MCHC RBC AUTO-ENTMCNC: 33.1 G/DL (ref 33–37)
MCV RBC AUTO: 96.1 FL (ref 81–99)
MICROALBUMIN UR-MCNC: 188.5 MG/DL (ref 0–19)
MICROALBUMIN/CREAT UR-RTO: 1030.1 MG/G
MONOCYTES # BLD: 0.4 K/UL (ref 0–0.9)
MONOCYTES NFR BLD: 5.5 % (ref 0–10)
NEUTROPHILS # BLD: 5.8 K/UL (ref 1.5–7.5)
NEUTS SEG NFR BLD: 80.1 % (ref 50–65)
NITRITE UR QL STRIP.AUTO: NEGATIVE
PH UR STRIP.AUTO: 5.5 [PH] (ref 5–8)
PLATELET # BLD AUTO: 220 K/UL (ref 130–400)
PMV BLD AUTO: 13.5 FL (ref 9.4–12.3)
POTASSIUM SERPL-SCNC: 3.9 MMOL/L (ref 3.5–5)
PROT SERPL-MCNC: 7 G/DL (ref 6.6–8.7)
PROT UR STRIP.AUTO-MCNC: 300 MG/DL
RBC # BLD AUTO: 4.31 M/UL (ref 4.2–5.4)
RBC #/AREA URNS HPF: ABNORMAL /HPF (ref 0–2)
SODIUM SERPL-SCNC: 139 MMOL/L (ref 136–145)
SP GR UR STRIP.AUTO: 1.03 (ref 1–1.03)
T4 FREE SERPL-MCNC: 1.12 NG/DL (ref 0.93–1.7)
TRIGL SERPL-MCNC: 88 MG/DL (ref 0–149)
TSH SERPL DL<=0.005 MIU/L-ACNC: 2.5 UIU/ML (ref 0.27–4.2)
UROBILINOGEN UR STRIP.AUTO-MCNC: 2 E.U./DL
VIT B12 SERPL-MCNC: 416 PG/ML (ref 211–946)
WBC # BLD AUTO: 7.2 K/UL (ref 4.8–10.8)
WBC #/AREA URNS AUTO: 5 /HPF (ref 0–5)

## 2024-07-26 PROCEDURE — 1123F ACP DISCUSS/DSCN MKR DOCD: CPT | Performed by: NURSE PRACTITIONER

## 2024-07-26 PROCEDURE — 3077F SYST BP >= 140 MM HG: CPT | Performed by: NURSE PRACTITIONER

## 2024-07-26 PROCEDURE — 99203 OFFICE O/P NEW LOW 30 MIN: CPT | Performed by: NURSE PRACTITIONER

## 2024-07-26 PROCEDURE — 3079F DIAST BP 80-89 MM HG: CPT | Performed by: NURSE PRACTITIONER

## 2024-07-26 RX ORDER — IRBESARTAN 150 MG/1
150 TABLET ORAL DAILY
Qty: 30 TABLET | Refills: 5 | Status: SHIPPED | OUTPATIENT
Start: 2024-07-26

## 2024-07-26 RX ORDER — CEPHALEXIN 500 MG/1
500 CAPSULE ORAL 3 TIMES DAILY
Qty: 30 CAPSULE | Refills: 0 | Status: SHIPPED | OUTPATIENT
Start: 2024-07-26 | End: 2024-08-05

## 2024-07-26 ASSESSMENT — ENCOUNTER SYMPTOMS
SORE THROAT: 0
SHORTNESS OF BREATH: 0
ABDOMINAL PAIN: 0
DIARRHEA: 0
RHINORRHEA: 0
CONSTIPATION: 0
VOMITING: 0
NAUSEA: 0
EYE REDNESS: 0
COUGH: 0

## 2024-07-26 NOTE — PROGRESS NOTES
Sharmin Petersen (:  1960) is a 64 y.o. female,New patient, here for evaluation of the following chief complaint(s):  New Patient and Altered Mental Status      ASSESSMENT/PLAN:    ICD-10-CM    1. Type 2 diabetes mellitus with hyperglycemia, without long-term current use of insulin (HCC)  E11.65 Patient reported that she was diabetic.  I have no medical history.  The patient reports she does not take any medications for her diabetes.    CBC with Auto Differential     Comprehensive Metabolic Panel     TSH     T4, Free     Lipid Panel     Microalbumin / Creatinine Urine Ratio     Hemoglobin A1C     Urinalysis      2. Altered mental status  R41.82 Urinalysis     Vitamin B12     CT HEAD WO CONTRAST     Rapid Drug Screen      3. Primary hypertension  I10 CBC with Auto Differential     Comprehensive Metabolic Panel     TSH     T4, Free     Lipid Panel     Microalbumin / Creatinine Urine Ratio     Hemoglobin A1C     irbesartan (AVAPRO) 150 MG tablet  Patient is asked to monitor BP at home or work, several times per month and return with written values at next office visit.        4. Tobacco abuse  Z72.0 Smoking cessation recommended      5. Staph skin infection  L08.9 cephALEXin (KEFLEX) 500 MG capsule    Clean areas twice daily with antibacterial soap and water    B95.8           Return in about 2 weeks (around 2024), or if symptoms worsen or fail to improve.    SUBJECTIVE/OBJECTIVE:  HPI    \"I haven't been to a doctor in years.\"  She hasn't had lab work in years.  She moved back from [place] a couple of weeks ago.  She is living with her son.  Her son reports they have not talked in probably greater than a year.  Since she has been home he is very concerned about her memory    She is diabetic  She does not take any medication.    When asked where she was located she knew she was in [facility]  When asked the year she thought it was   \"I'd have to look to see what month it is.\"  She thinks it is fall.    She

## 2024-07-28 LAB
AMPHET CTO UR CFM-MCNC: NEGATIVE NG/ML
BARBITURATES CTO UR CFM-MCNC: NEGATIVE NG/ML
BENZODIAZ CTO UR CFM-MCNC: NEGATIVE NG/ML
BUPRENORPHINE UR QL SCN: NEGATIVE NG/ML
CANNABINOIDS CTO UR CFM-MCNC: NEGATIVE NG/ML
CARISOPRODOL UR QL: NEGATIVE NG/ML
COCAINE CTO UR CFM-MCNC: NEGATIVE NG/ML
CREAT UR-MCNC: 162.9 MG/DL (ref 20–400)
DRUG SCREEN COMMENT UR-IMP: NORMAL
ETHYL GLUCURONIDE UR QL SCN: NEGATIVE NG/ML
FENTANYL UR QL: NEGATIVE NG/ML
MEPERIDINE UR QL: NEGATIVE NG/ML
METHADONE CTO UR CFM-MCNC: NEGATIVE NG/ML
OPIATES UR QL SCN: NEGATIVE NG/ML
OXYCODONE SCREEN URINE: NEGATIVE NG/ML
PCP CTO UR CFM-MCNC: NEGATIVE NG/ML
PROPOXYPH CTO UR CFM-MCNC: NEGATIVE NG/ML
TAPENTADOL UR QL SCN: NEGATIVE NG/ML
TRAMADOL SCREEN URINE: NEGATIVE NG/ML
ZOLPIDEM UR QL SCN: NEGATIVE NG/ML

## 2024-07-29 ENCOUNTER — TELEPHONE (OUTPATIENT)
Dept: FAMILY MEDICINE CLINIC | Age: 64
End: 2024-07-29

## 2024-07-29 DIAGNOSIS — R74.8 ALKALINE PHOSPHATASE ELEVATION: Primary | ICD-10-CM

## 2024-07-29 NOTE — TELEPHONE ENCOUNTER
----- Message from MINH Redmond sent at 7/29/2024 10:37 AM CDT -----  Please call patient and let them know results.   Metabolic panel is normal except for elevated blood sugar at 154  Alkaline phosphatase is slightly elevated.  Recommend taking vitamin D supplement available over-the-counter daily and rechecking CMP in 3 months  There is microalbumin in the urine.  This is an early sign of kidney disease.  It is imperative we control blood sugars, blood pressure, and ensure good hydration to avoid further kidney complications.  Blood sugars are not controlled with an A1c of 7.4.  We would like to see this less than 7  Normal blood counts  Normal B12  Normal thyroid  There is red blood cells in the urine, no signs of infection.    Keep follow-up with Leonor scheduled 8/9/2024 to further discuss these results.  Is imperative we get blood sugars under better control to prevent diabetic complications and worsening kidney disease.

## 2024-07-30 ENCOUNTER — TELEPHONE (OUTPATIENT)
Dept: FAMILY MEDICINE CLINIC | Age: 64
End: 2024-07-30

## 2024-07-30 DIAGNOSIS — E11.65 TYPE 2 DIABETES MELLITUS WITH HYPERGLYCEMIA, WITHOUT LONG-TERM CURRENT USE OF INSULIN (HCC): Primary | ICD-10-CM

## 2024-07-30 NOTE — TELEPHONE ENCOUNTER
----- Message from MINH Jordan sent at 7/29/2024  4:21 PM CDT -----  Recommend adding metformin 500 mg, 1 tablet twice daily.  Dispense #60.  Refills #5.

## 2024-07-30 NOTE — TELEPHONE ENCOUNTER
Called patient,spoke with  Chu, verified name and date of birth, and gave results in detail as per provider notes below. He verbalized understanding and had no further questions.  Metformin sent to Pharm.

## 2024-08-05 NOTE — TELEPHONE ENCOUNTER
Called patient, spoke with: Spouse regarding the results of the patients most recent labs.  I advised Spouse of Hipolito Hewitt recommendations.   Spouse did voice understanding

## 2024-08-09 ENCOUNTER — OFFICE VISIT (OUTPATIENT)
Dept: FAMILY MEDICINE CLINIC | Age: 64
End: 2024-08-09

## 2024-08-09 VITALS
OXYGEN SATURATION: 96 % | BODY MASS INDEX: 31.24 KG/M2 | DIASTOLIC BLOOD PRESSURE: 86 MMHG | WEIGHT: 183 LBS | HEIGHT: 64 IN | TEMPERATURE: 97.6 F | HEART RATE: 79 BPM | SYSTOLIC BLOOD PRESSURE: 138 MMHG

## 2024-08-09 DIAGNOSIS — J44.9 CHRONIC OBSTRUCTIVE PULMONARY DISEASE, UNSPECIFIED COPD TYPE (HCC): ICD-10-CM

## 2024-08-09 DIAGNOSIS — Z72.0 TOBACCO ABUSE: ICD-10-CM

## 2024-08-09 DIAGNOSIS — F03.A0 MILD DEMENTIA WITHOUT BEHAVIORAL DISTURBANCE, PSYCHOTIC DISTURBANCE, MOOD DISTURBANCE, OR ANXIETY, UNSPECIFIED DEMENTIA TYPE (HCC): ICD-10-CM

## 2024-08-09 DIAGNOSIS — I10 PRIMARY HYPERTENSION: ICD-10-CM

## 2024-08-09 DIAGNOSIS — E11.65 TYPE 2 DIABETES MELLITUS WITH HYPERGLYCEMIA, WITHOUT LONG-TERM CURRENT USE OF INSULIN (HCC): Primary | ICD-10-CM

## 2024-08-09 PROCEDURE — 99214 OFFICE O/P EST MOD 30 MIN: CPT | Performed by: NURSE PRACTITIONER

## 2024-08-09 PROCEDURE — 1123F ACP DISCUSS/DSCN MKR DOCD: CPT | Performed by: NURSE PRACTITIONER

## 2024-08-09 PROCEDURE — 3079F DIAST BP 80-89 MM HG: CPT | Performed by: NURSE PRACTITIONER

## 2024-08-09 PROCEDURE — 3075F SYST BP GE 130 - 139MM HG: CPT | Performed by: NURSE PRACTITIONER

## 2024-08-09 PROCEDURE — 3051F HG A1C>EQUAL 7.0%<8.0%: CPT | Performed by: NURSE PRACTITIONER

## 2024-08-09 RX ORDER — MEMANTINE HYDROCHLORIDE 5 MG/1
5 TABLET ORAL DAILY
Qty: 30 TABLET | Refills: 5 | Status: SHIPPED | OUTPATIENT
Start: 2024-08-09

## 2024-08-09 ASSESSMENT — ENCOUNTER SYMPTOMS
RHINORRHEA: 0
EYE REDNESS: 0
ABDOMINAL PAIN: 0
CONSTIPATION: 0
SORE THROAT: 0
VOMITING: 0
DIARRHEA: 0
NAUSEA: 0
SHORTNESS OF BREATH: 0
COUGH: 0

## 2024-08-09 NOTE — PROGRESS NOTES
Sharmin Petersen (:  1960) is a 64 y.o. female,Established patient, here for evaluation of the following chief complaint(s):  No chief complaint on file.      ASSESSMENT/PLAN:    ICD-10-CM    1. Type 2 diabetes mellitus with hyperglycemia, without long-term current use of insulin (HCC)  E11.65 Hemoglobin A1C    Recommend patient start metformin 500 mg twice daily    Recommend ADA diet    Recommend exercise as tolerated      2. Primary hypertension  I10 Improved  Continue Avapro 150 mg daily      3. Tobacco abuse  Z72.0 umeclidinium-vilanterol (ANORO ELLIPTA) 62.5-25 MCG/ACT inhaler     Smoking cessation recommended      4. Chronic obstructive pulmonary disease, unspecified COPD type ([facility])  J44.9 umeclidinium-vilanterol (ANORO ELLIPTA) 62.5-25 MCG/ACT inhaler      5. Mild dementia without behavioral disturbance, psychotic disturbance, mood disturbance, or anxiety, unspecified dementia type ([facility])  F03.A0 memantine (NAMENDA) 5 MG tablet    CT scan of head is scheduled next week    Recommended neurology consultation.  Patient refuses at this time.          Return in about 3 months (around 10/29/2024), or if symptoms worsen or fail to improve, for Diabetic follow-up, 30 minute appointment.    SUBJECTIVE/OBJECTIVE:  HPI    MEMORY LOSS:  Reports trouble remembering things that happened 40 years ago.  She remembers that she ate chicken noodle soup for [employer] today.  She forgets stuff.   Her son told her she had a doctor's appointment today.   She forgot about it.   She thought the doctor's appointment was next Friday.  She denies getting lost when she driving.  She forgot to turn the stove off one time when cooking  She remembers her son and daughter's birthdays  She forgot what year it is.    CT scan of head is scheduled for August 15.    DM:  She has not started taking metformin 500 mg BID yet  Denies excessive thirst or urination.  She has not been checking her blood sugars.  She will start the metformin.    She

## 2024-08-31 ENCOUNTER — APPOINTMENT (OUTPATIENT)
Dept: CT IMAGING | Age: 64
End: 2024-08-31
Payer: MEDICARE

## 2024-08-31 ENCOUNTER — HOSPITAL ENCOUNTER (EMERGENCY)
Age: 64
Discharge: HOME | End: 2024-08-31
Attending: STUDENT IN AN ORGANIZED HEALTH CARE EDUCATION/TRAINING PROGRAM
Payer: MEDICARE

## 2024-08-31 VITALS
TEMPERATURE: 98.7 F | DIASTOLIC BLOOD PRESSURE: 66 MMHG | WEIGHT: 177.2 LBS | BODY MASS INDEX: 30.42 KG/M2 | OXYGEN SATURATION: 97 % | SYSTOLIC BLOOD PRESSURE: 160 MMHG | HEART RATE: 84 BPM | RESPIRATION RATE: 17 BRPM

## 2024-08-31 DIAGNOSIS — F69 BEHAVIOR PROBLEM, ADULT: Primary | ICD-10-CM

## 2024-08-31 DIAGNOSIS — F03.A0 MILD DEMENTIA WITHOUT BEHAVIORAL DISTURBANCE, PSYCHOTIC DISTURBANCE, MOOD DISTURBANCE, OR ANXIETY, UNSPECIFIED DEMENTIA TYPE (HCC): ICD-10-CM

## 2024-08-31 LAB
ALBUMIN SERPL-MCNC: 3.9 G/DL (ref 3.5–5.2)
ALP SERPL-CCNC: 137 U/L (ref 35–104)
ALT SERPL-CCNC: 16 U/L (ref 5–33)
AMPHET UR QL SCN: NEGATIVE
ANION GAP SERPL CALCULATED.3IONS-SCNC: 11 MMOL/L (ref 7–19)
AST SERPL-CCNC: 19 U/L (ref 5–32)
BACTERIA #/AREA URNS HPF: NORMAL /HPF
BARBITURATES UR QL SCN: NEGATIVE
BASOPHILS # BLD: 0.1 K/UL (ref 0–0.2)
BASOPHILS NFR BLD: 0.6 % (ref 0–1)
BENZODIAZ UR QL SCN: NEGATIVE
BILIRUB SERPL-MCNC: 0.8 MG/DL (ref 0.2–1.2)
BILIRUB UR QL STRIP: ABNORMAL
BNP BLD-MCNC: ABNORMAL PG/ML (ref 0–124)
BUN SERPL-MCNC: 18 MG/DL (ref 8–23)
BUPRENORPHINE URINE: NEGATIVE
CALCIUM SERPL-MCNC: 9 MG/DL (ref 8.8–10.2)
CANNABINOIDS UR QL SCN: NEGATIVE
CHLORIDE SERPL-SCNC: 101 MMOL/L (ref 98–111)
CLARITY UR: ABNORMAL
CO2 SERPL-SCNC: 24 MMOL/L (ref 22–29)
COCAINE UR QL SCN: NEGATIVE
COLOR UR: ABNORMAL
CREAT SERPL-MCNC: 0.8 MG/DL (ref 0.5–0.9)
DRUG SCREEN COMMENT UR-IMP: NORMAL
EOSINOPHIL # BLD: 0.1 K/UL (ref 0–0.6)
EOSINOPHIL NFR BLD: 1.1 % (ref 0–5)
ERYTHROCYTE [DISTWIDTH] IN BLOOD BY AUTOMATED COUNT: 12.9 % (ref 11.5–14.5)
ETHANOLAMINE SERPL-MCNC: <10 MG/DL (ref 0–0.08)
FENTANYL SCREEN, URINE: NEGATIVE
GLUCOSE SERPL-MCNC: 218 MG/DL (ref 70–99)
GLUCOSE UR STRIP.AUTO-MCNC: NEGATIVE MG/DL
HCT VFR BLD AUTO: 47.3 % (ref 37–47)
HGB BLD-MCNC: 15.6 G/DL (ref 12–16)
HGB UR STRIP.AUTO-MCNC: ABNORMAL MG/L
HYALINE CASTS #/AREA URNS LPF: NORMAL /LPF (ref 0–5)
IMM GRANULOCYTES # BLD: 0 K/UL
KETONES UR STRIP.AUTO-MCNC: NEGATIVE MG/DL
LEUKOCYTE ESTERASE UR QL STRIP.AUTO: ABNORMAL
LIPASE SERPL-CCNC: 22 U/L (ref 13–60)
LYMPHOCYTES # BLD: 1.2 K/UL (ref 1.1–4.5)
LYMPHOCYTES NFR BLD: 12.9 % (ref 20–40)
MCH RBC QN AUTO: 30.7 PG (ref 27–31)
MCHC RBC AUTO-ENTMCNC: 33 G/DL (ref 33–37)
MCV RBC AUTO: 93.1 FL (ref 81–99)
METHADONE UR QL SCN: NEGATIVE
METHAMPHETAMINE, URINE: NEGATIVE
MONOCYTES # BLD: 0.6 K/UL (ref 0–0.9)
MONOCYTES NFR BLD: 6 % (ref 0–10)
NEUTROPHILS # BLD: 7.3 K/UL (ref 1.5–7.5)
NEUTS SEG NFR BLD: 79.1 % (ref 50–65)
NITRITE UR QL STRIP.AUTO: NEGATIVE
OPIATES UR QL SCN: NEGATIVE
OXYCODONE UR QL SCN: NEGATIVE
PCP UR QL SCN: NEGATIVE
PH UR STRIP.AUTO: 5.5 [PH] (ref 5–8)
PLATELET # BLD AUTO: 251 K/UL (ref 130–400)
PMV BLD AUTO: 11.9 FL (ref 9.4–12.3)
POTASSIUM SERPL-SCNC: 4.6 MMOL/L (ref 3.5–5)
PROT SERPL-MCNC: 7.4 G/DL (ref 6.4–8.3)
PROT UR STRIP.AUTO-MCNC: 300 MG/DL
RBC # BLD AUTO: 5.08 M/UL (ref 4.2–5.4)
RBC #/AREA URNS HPF: NORMAL /HPF (ref 0–2)
SODIUM SERPL-SCNC: 136 MMOL/L (ref 136–145)
SP GR UR STRIP.AUTO: 1.02 (ref 1–1.03)
SQUAMOUS #/AREA URNS HPF: NORMAL /HPF
TRICYCLIC ANTIDEPRESSANTS, UR: NEGATIVE
UROBILINOGEN UR STRIP.AUTO-MCNC: 1 E.U./DL
WBC # BLD AUTO: 9.3 K/UL (ref 4.8–10.8)
WBC #/AREA URNS HPF: NORMAL /HPF (ref 0–5)

## 2024-08-31 PROCEDURE — 87086 URINE CULTURE/COLONY COUNT: CPT

## 2024-08-31 PROCEDURE — 81001 URINALYSIS AUTO W/SCOPE: CPT

## 2024-08-31 PROCEDURE — 83880 ASSAY OF NATRIURETIC PEPTIDE: CPT

## 2024-08-31 PROCEDURE — 82077 ASSAY SPEC XCP UR&BREATH IA: CPT

## 2024-08-31 PROCEDURE — 70450 CT HEAD/BRAIN W/O DYE: CPT

## 2024-08-31 PROCEDURE — 87077 CULTURE AEROBIC IDENTIFY: CPT

## 2024-08-31 PROCEDURE — 36415 COLL VENOUS BLD VENIPUNCTURE: CPT

## 2024-08-31 PROCEDURE — G0480 DRUG TEST DEF 1-7 CLASSES: HCPCS

## 2024-08-31 PROCEDURE — 83690 ASSAY OF LIPASE: CPT

## 2024-08-31 PROCEDURE — 80053 COMPREHEN METABOLIC PANEL: CPT

## 2024-08-31 PROCEDURE — 85025 COMPLETE CBC W/AUTO DIFF WBC: CPT

## 2024-08-31 PROCEDURE — 80307 DRUG TEST PRSMV CHEM ANLYZR: CPT

## 2024-08-31 PROCEDURE — 99284 EMERGENCY DEPT VISIT MOD MDM: CPT

## 2024-08-31 RX ORDER — MEMANTINE HYDROCHLORIDE 5 MG/1
5 TABLET ORAL DAILY
Qty: 30 TABLET | Refills: 0 | Status: SHIPPED | OUTPATIENT
Start: 2024-08-31

## 2024-08-31 ASSESSMENT — ENCOUNTER SYMPTOMS
EYE REDNESS: 0
ABDOMINAL PAIN: 0
EYE PAIN: 0
VOMITING: 0
CHEST TIGHTNESS: 0
SHORTNESS OF BREATH: 0
NAUSEA: 0
DIARRHEA: 0
SORE THROAT: 0
COUGH: 0

## 2024-08-31 NOTE — DISCHARGE INSTRUCTIONS
You should ensure that Padma is taking her prescribed memantine which can help with her memory and behavior issues.  A new prescription has been sent to [facility]

## 2024-08-31 NOTE — ED PROVIDER NOTES
[facility] EMERGENCY DEPT  eMERGENCY dEPARTMENT eNCOUnter      Pt Name: Sharmin Petersen  MRN: [id number]  Birthdate 1960  Date of evaluation: 8/31/2024  Provider: Jana Guillen MD    CHIEF COMPLAINT       Chief Complaint   Patient presents with    Altered Mental Status         HISTORY OF PRESENT ILLNESS   (Location/Symptom, Timing/Onset,Context/Setting, Quality, Duration, Modifying Factors, Severity)  Note limiting factors.     HPI    Sharmin Petersen is a 64 y.o. female with PMH of mild dementia, hypertension, type 2 diabetes, COPD who presents to the emergency department with CC of altered mental status, personality change, forgetfulness.  Patient is here with her daughter and son-in-law.  She states that she has no complaints and does not know why she has to be here, but is agreeable to workup.    Patient's daughter and son-in-law state that over the last year, patient has had worsening behavior changes, intermittent aggressive behaviors, standoffish, forgetfulness, and decreased ability to care for herself.  She has apparently been living with friends or family members on and off for the last year.  She reports she has been living with her son up until 3 days ago when they had a disagreement and son kicked her out.  She has since been staying in a hotel with a friend.  Her family is concerned that she is no longer taking any of her medications, which she confirms with me that she is not.  They are unsure if she is using any drugs as she has apparently stolen pain medication from them in the past.  She is not suicidal, no AVH.  When asked if she wants to hurt anyone, she states that she wants to hurt her son but that she would never act on that and states that she says it as an expression of anger.  She denies any specific medical complaints.    NursingNotes were reviewed.    REVIEW OF SYSTEMS    (2-9 systems for level 4, 10 or more for level 5)     Review of Systems   Constitutional:  Negative for chills, fatigue and

## 2024-09-01 LAB — BACTERIA UR CULT: NO GROWTH

## 2024-09-02 LAB
BACTERIA UR CULT: ABNORMAL
BACTERIA UR CULT: ABNORMAL
ORGANISM: ABNORMAL

## 2024-09-26 ENCOUNTER — TELEMEDICINE (OUTPATIENT)
Dept: FAMILY MEDICINE CLINIC | Age: 64
End: 2024-09-26

## 2024-09-26 DIAGNOSIS — Z91.199 NONCOMPLIANCE: ICD-10-CM

## 2024-09-26 DIAGNOSIS — E11.65 TYPE 2 DIABETES MELLITUS WITH HYPERGLYCEMIA, WITHOUT LONG-TERM CURRENT USE OF INSULIN (HCC): ICD-10-CM

## 2024-09-26 DIAGNOSIS — R44.3 HALLUCINATIONS: ICD-10-CM

## 2024-09-26 DIAGNOSIS — M54.50 LUMBAR PAIN: ICD-10-CM

## 2024-09-26 DIAGNOSIS — R41.0 INTERMITTENT CONFUSION: Primary | ICD-10-CM

## 2024-09-26 DIAGNOSIS — I10 PRIMARY HYPERTENSION: ICD-10-CM

## 2024-09-26 DIAGNOSIS — M25.561 CHRONIC PAIN OF BOTH KNEES: ICD-10-CM

## 2024-09-26 DIAGNOSIS — M25.562 CHRONIC PAIN OF BOTH KNEES: ICD-10-CM

## 2024-09-26 DIAGNOSIS — J44.9 CHRONIC OBSTRUCTIVE PULMONARY DISEASE, UNSPECIFIED COPD TYPE (HCC): ICD-10-CM

## 2024-09-26 DIAGNOSIS — G47.9 SLEEP DISTURBANCE: ICD-10-CM

## 2024-09-26 DIAGNOSIS — G89.29 CHRONIC PAIN OF BOTH KNEES: ICD-10-CM

## 2024-09-26 ASSESSMENT — ENCOUNTER SYMPTOMS
RHINORRHEA: 0
COUGH: 1
SHORTNESS OF BREATH: 1
BACK PAIN: 1

## 2024-10-18 ENCOUNTER — TELEPHONE (OUTPATIENT)
Dept: FAMILY MEDICINE CLINIC | Age: 64
End: 2024-10-18

## 2024-10-18 NOTE — TELEPHONE ENCOUNTER
Patients son called requesting a referral for CC convalescence. She has gotten worse.     Last seen via [facility] on 9/26.    Does she need another appt for this referral?

## 2024-10-18 NOTE — TELEPHONE ENCOUNTER
Janeen put in referral to [facility].  This is what he had requested previously.    I am okay with referral to [facility].  If this is where he is wanting to try and get her in now.

## 2024-10-21 NOTE — TELEPHONE ENCOUNTER
Spoke to [facility] did not have [facility] availability     Faxed to [facility] via rightfax

## 2024-10-21 NOTE — TELEPHONE ENCOUNTER
[facility] called - patient has HMO that won't cover her stay with them. They are out of network. Called pt spouse Chu - He would like it sent back to [facility]. Faxed via right fax.

## 2025-03-30 ENCOUNTER — APPOINTMENT (OUTPATIENT)
Dept: CT IMAGING | Age: 65
End: 2025-03-30
Payer: MEDICARE

## 2025-03-30 ENCOUNTER — HOSPITAL ENCOUNTER (INPATIENT)
Age: 65
LOS: 5 days | Discharge: NURSING, REHAB OR LONG-TERM CARE | End: 2025-04-04
Attending: EMERGENCY MEDICINE
Payer: MEDICARE

## 2025-03-30 ENCOUNTER — APPOINTMENT (OUTPATIENT)
Dept: GENERAL RADIOLOGY | Facility: HOSPITAL | Age: 65
End: 2025-03-30
Payer: MEDICARE

## 2025-03-30 ENCOUNTER — HOSPITAL ENCOUNTER (EMERGENCY)
Facility: HOSPITAL | Age: 65
Discharge: HOME | End: 2025-03-30
Attending: FAMILY MEDICINE | Admitting: FAMILY MEDICINE
Payer: MEDICARE

## 2025-03-30 ENCOUNTER — APPOINTMENT (OUTPATIENT)
Dept: CT IMAGING | Facility: HOSPITAL | Age: 65
End: 2025-03-30
Payer: MEDICARE

## 2025-03-30 ENCOUNTER — APPOINTMENT (OUTPATIENT)
Dept: GENERAL RADIOLOGY | Age: 65
End: 2025-03-30
Payer: MEDICARE

## 2025-03-30 VITALS
DIASTOLIC BLOOD PRESSURE: 58 MMHG | BODY MASS INDEX: 26.92 KG/M2 | RESPIRATION RATE: 18 BRPM | HEIGHT: 65 IN | WEIGHT: 161.6 LBS | OXYGEN SATURATION: 96 % | HEART RATE: 81 BPM | TEMPERATURE: 97.8 F | SYSTOLIC BLOOD PRESSURE: 164 MMHG

## 2025-03-30 DIAGNOSIS — I42.9 CARDIOMYOPATHY, UNSPECIFIED TYPE (HCC): ICD-10-CM

## 2025-03-30 DIAGNOSIS — I63.9 CEREBROVASCULAR ACCIDENT (CVA), UNSPECIFIED MECHANISM (HCC): ICD-10-CM

## 2025-03-30 DIAGNOSIS — R94.39 ABNORMAL CARDIOVASCULAR STRESS TEST: ICD-10-CM

## 2025-03-30 DIAGNOSIS — R53.1 ACUTE LEFT-SIDED WEAKNESS: Primary | ICD-10-CM

## 2025-03-30 DIAGNOSIS — R53.1 WEAKNESS: Primary | ICD-10-CM

## 2025-03-30 DIAGNOSIS — I63.9 STROKE DETERMINED BY CLINICAL ASSESSMENT (HCC): ICD-10-CM

## 2025-03-30 LAB
ALBUMIN SERPL-MCNC: 3.7 G/DL (ref 3.5–5.2)
ALBUMIN SERPL-MCNC: 3.9 G/DL (ref 3.5–5.2)
ALBUMIN/GLOB SERPL: 1.1 G/DL
ALP SERPL-CCNC: 81 U/L (ref 39–117)
ALP SERPL-CCNC: 88 U/L (ref 35–104)
ALT SERPL W P-5'-P-CCNC: 11 U/L (ref 1–33)
ALT SERPL-CCNC: 10 U/L (ref 10–35)
ANION GAP SERPL CALCULATED.3IONS-SCNC: 12 MMOL/L (ref 8–16)
ANION GAP SERPL CALCULATED.3IONS-SCNC: 9 MMOL/L (ref 5–15)
APTT PPP: 31.6 SECONDS (ref 24.5–36)
AST SERPL-CCNC: 14 U/L (ref 1–32)
AST SERPL-CCNC: 18 U/L (ref 10–35)
BACTERIA UR QL AUTO: ABNORMAL /HPF
BASOPHILS # BLD AUTO: 0.05 10*3/MM3 (ref 0–0.2)
BASOPHILS # BLD: 0.1 K/UL (ref 0–0.2)
BASOPHILS NFR BLD AUTO: 0.5 % (ref 0–1.5)
BASOPHILS NFR BLD: 0.7 % (ref 0–1)
BILIRUB SERPL-MCNC: 0.5 MG/DL (ref 0.2–1.2)
BILIRUB SERPL-MCNC: 0.5 MG/DL (ref 0–1.2)
BILIRUB UR QL STRIP: NEGATIVE
BILIRUB UR QL STRIP: NEGATIVE
BUN SERPL-MCNC: 16 MG/DL (ref 8–23)
BUN SERPL-MCNC: 16 MG/DL (ref 8–23)
BUN/CREAT SERPL: 16 (ref 7–25)
CALCIUM SERPL-MCNC: 9.2 MG/DL (ref 8.8–10.2)
CALCIUM SPEC-SCNC: 9.1 MG/DL (ref 8.6–10.5)
CHLORIDE SERPL-SCNC: 103 MMOL/L (ref 98–107)
CHLORIDE SERPL-SCNC: 106 MMOL/L (ref 98–107)
CLARITY UR: CLEAR
CLARITY UR: CLEAR
CO2 SERPL-SCNC: 22 MMOL/L (ref 22–29)
CO2 SERPL-SCNC: 23 MMOL/L (ref 22–29)
COLOR UR: YELLOW
COLOR UR: YELLOW
CREAT SERPL-MCNC: 0.9 MG/DL (ref 0.5–0.9)
CREAT SERPL-MCNC: 1 MG/DL (ref 0.57–1)
DEPRECATED RDW RBC AUTO: 44.8 FL (ref 37–54)
EGFRCR SERPLBLD CKD-EPI 2021: 62.3 ML/MIN/1.73
EOSINOPHIL # BLD AUTO: 0.11 10*3/MM3 (ref 0–0.4)
EOSINOPHIL # BLD: 0.1 K/UL (ref 0–0.6)
EOSINOPHIL NFR BLD AUTO: 1 % (ref 0.3–6.2)
EOSINOPHIL NFR BLD: 1.1 % (ref 0–5)
ERYTHROCYTE [DISTWIDTH] IN BLOOD BY AUTOMATED COUNT: 12.8 % (ref 11.5–14.5)
ERYTHROCYTE [DISTWIDTH] IN BLOOD BY AUTOMATED COUNT: 13 % (ref 12.3–15.4)
GEN 5 1HR TROPONIN T REFLEX: 34 NG/L
GLOBULIN UR ELPH-MCNC: 3.4 GM/DL
GLUCOSE BLDC GLUCOMTR-MCNC: 117 MG/DL (ref 70–130)
GLUCOSE SERPL-MCNC: 111 MG/DL (ref 65–99)
GLUCOSE SERPL-MCNC: 163 MG/DL (ref 70–99)
GLUCOSE UR STRIP-MCNC: NEGATIVE MG/DL
GLUCOSE UR STRIP.AUTO-MCNC: NEGATIVE MG/DL
HBA1C MFR BLD: 6 % (ref 4–5.6)
HCT VFR BLD AUTO: 36.9 % (ref 34–46.6)
HCT VFR BLD AUTO: 37.3 % (ref 37–47)
HGB BLD-MCNC: 12 G/DL (ref 12–15.9)
HGB BLD-MCNC: 12.2 G/DL (ref 12–16)
HGB UR QL STRIP.AUTO: ABNORMAL
HGB UR STRIP.AUTO-MCNC: NEGATIVE MG/L
HOLD SPECIMEN: NORMAL
HYALINE CASTS UR QL AUTO: ABNORMAL /LPF
IMM GRANULOCYTES # BLD AUTO: 0.03 10*3/MM3 (ref 0–0.05)
IMM GRANULOCYTES # BLD: 0 K/UL
IMM GRANULOCYTES NFR BLD AUTO: 0.3 % (ref 0–0.5)
INR PPP: 1.06 (ref 0.91–1.09)
INR PPP: 1.16 (ref 0.88–1.18)
KETONES UR QL STRIP: NEGATIVE
KETONES UR STRIP.AUTO-MCNC: NEGATIVE MG/DL
LEUKOCYTE ESTERASE UR QL STRIP.AUTO: ABNORMAL
LEUKOCYTE ESTERASE UR QL STRIP.AUTO: NEGATIVE
LYMPHOCYTES # BLD AUTO: 1.61 10*3/MM3 (ref 0.7–3.1)
LYMPHOCYTES # BLD: 1.8 K/UL (ref 1.1–4.5)
LYMPHOCYTES NFR BLD AUTO: 14.9 % (ref 19.6–45.3)
LYMPHOCYTES NFR BLD: 17.6 % (ref 20–40)
MAGNESIUM SERPL-MCNC: 1.4 MG/DL (ref 1.6–2.4)
MCH RBC QN AUTO: 30.4 PG (ref 26.6–33)
MCH RBC QN AUTO: 30.7 PG (ref 27–31)
MCHC RBC AUTO-ENTMCNC: 32.5 G/DL (ref 31.5–35.7)
MCHC RBC AUTO-ENTMCNC: 32.7 G/DL (ref 33–37)
MCV RBC AUTO: 93.4 FL (ref 79–97)
MCV RBC AUTO: 94 FL (ref 81–99)
MONOCYTES # BLD AUTO: 0.51 10*3/MM3 (ref 0.1–0.9)
MONOCYTES # BLD: 0.4 K/UL (ref 0–0.9)
MONOCYTES NFR BLD AUTO: 4.7 % (ref 5–12)
MONOCYTES NFR BLD: 4.3 % (ref 0–10)
NEUTROPHILS # BLD: 7.9 K/UL (ref 1.5–7.5)
NEUTROPHILS NFR BLD AUTO: 78.6 % (ref 42.7–76)
NEUTROPHILS NFR BLD AUTO: 8.47 10*3/MM3 (ref 1.7–7)
NEUTS SEG NFR BLD: 76.1 % (ref 50–65)
NITRITE UR QL STRIP.AUTO: NEGATIVE
NITRITE UR QL STRIP: POSITIVE
NRBC BLD AUTO-RTO: 0 /100 WBC (ref 0–0.2)
PH UR STRIP.AUTO: 5 [PH] (ref 5–8)
PH UR STRIP.AUTO: 5.5 [PH] (ref 5–8)
PLATELET # BLD AUTO: 277 K/UL (ref 130–400)
PLATELET # BLD AUTO: 291 10*3/MM3 (ref 140–450)
PMV BLD AUTO: 11.1 FL (ref 9.4–12.3)
PMV BLD AUTO: 11.5 FL (ref 6–12)
POTASSIUM SERPL-SCNC: 4.8 MMOL/L (ref 3.5–5)
POTASSIUM SERPL-SCNC: 5.3 MMOL/L (ref 3.5–5.2)
PROT SERPL-MCNC: 7.1 G/DL (ref 6–8.5)
PROT SERPL-MCNC: 7.3 G/DL (ref 6.4–8.3)
PROT UR QL STRIP: ABNORMAL
PROT UR STRIP.AUTO-MCNC: NEGATIVE MG/DL
PROTHROMBIN TIME: 14.4 SECONDS (ref 11.8–14.8)
PROTHROMBIN TIME: 14.7 SEC (ref 12–14.6)
RBC # BLD AUTO: 3.95 10*6/MM3 (ref 3.77–5.28)
RBC # BLD AUTO: 3.97 M/UL (ref 4.2–5.4)
RBC # UR STRIP: ABNORMAL /HPF
REF LAB TEST METHOD: ABNORMAL
SODIUM SERPL-SCNC: 137 MMOL/L (ref 136–145)
SODIUM SERPL-SCNC: 138 MMOL/L (ref 136–145)
SP GR UR STRIP.AUTO: >=1.045 (ref 1–1.03)
SP GR UR STRIP: >=1.03 (ref 1–1.03)
SQUAMOUS #/AREA URNS HPF: ABNORMAL /HPF
T4 FREE SERPL-MCNC: 1.31 NG/DL (ref 0.93–1.7)
TROPONIN T % DELTA: 0
TROPONIN T NUMERIC DELTA: 0 NG/L
TROPONIN T SERPL HS-MCNC: 34 NG/L
TROPONIN, HIGH SENSITIVITY: 35 NG/L (ref 0–14)
TSH SERPL DL<=0.005 MIU/L-ACNC: 1.66 UIU/ML (ref 0.27–4.2)
UROBILINOGEN UR QL STRIP: ABNORMAL
UROBILINOGEN UR STRIP.AUTO-MCNC: 1 E.U./DL
WBC # BLD AUTO: 10.3 K/UL (ref 4.8–10.8)
WBC # UR STRIP: ABNORMAL /HPF
WBC NRBC COR # BLD AUTO: 10.78 10*3/MM3 (ref 3.4–10.8)
WHOLE BLOOD HOLD COAG: NORMAL
WHOLE BLOOD HOLD SPECIMEN: NORMAL

## 2025-03-30 PROCEDURE — 85025 COMPLETE CBC W/AUTO DIFF WBC: CPT

## 2025-03-30 PROCEDURE — 82948 REAGENT STRIP/BLOOD GLUCOSE: CPT

## 2025-03-30 PROCEDURE — 36415 COLL VENOUS BLD VENIPUNCTURE: CPT

## 2025-03-30 PROCEDURE — 84484 ASSAY OF TROPONIN QUANT: CPT | Performed by: FAMILY MEDICINE

## 2025-03-30 PROCEDURE — 6370000000 HC RX 637 (ALT 250 FOR IP): Performed by: NURSE PRACTITIONER

## 2025-03-30 PROCEDURE — 81003 URINALYSIS AUTO W/O SCOPE: CPT

## 2025-03-30 PROCEDURE — 84484 ASSAY OF TROPONIN QUANT: CPT

## 2025-03-30 PROCEDURE — 85610 PROTHROMBIN TIME: CPT | Performed by: FAMILY MEDICINE

## 2025-03-30 PROCEDURE — 84443 ASSAY THYROID STIM HORMONE: CPT

## 2025-03-30 PROCEDURE — 85730 THROMBOPLASTIN TIME PARTIAL: CPT | Performed by: FAMILY MEDICINE

## 2025-03-30 PROCEDURE — 85025 COMPLETE CBC W/AUTO DIFF WBC: CPT | Performed by: FAMILY MEDICINE

## 2025-03-30 PROCEDURE — 71045 X-RAY EXAM CHEST 1 VIEW: CPT

## 2025-03-30 PROCEDURE — 6360000004 HC RX CONTRAST MEDICATION: Performed by: EMERGENCY MEDICINE

## 2025-03-30 PROCEDURE — 70496 CT ANGIOGRAPHY HEAD: CPT

## 2025-03-30 PROCEDURE — 99285 EMERGENCY DEPT VISIT HI MDM: CPT

## 2025-03-30 PROCEDURE — 2500000003 HC RX 250 WO HCPCS: Performed by: NURSE PRACTITIONER

## 2025-03-30 PROCEDURE — 84439 ASSAY OF FREE THYROXINE: CPT

## 2025-03-30 PROCEDURE — 83735 ASSAY OF MAGNESIUM: CPT | Performed by: FAMILY MEDICINE

## 2025-03-30 PROCEDURE — 85610 PROTHROMBIN TIME: CPT

## 2025-03-30 PROCEDURE — 93005 ELECTROCARDIOGRAM TRACING: CPT | Performed by: FAMILY MEDICINE

## 2025-03-30 PROCEDURE — 81001 URINALYSIS AUTO W/SCOPE: CPT | Performed by: FAMILY MEDICINE

## 2025-03-30 PROCEDURE — 1200000000 HC SEMI PRIVATE

## 2025-03-30 PROCEDURE — 80053 COMPREHEN METABOLIC PANEL: CPT | Performed by: FAMILY MEDICINE

## 2025-03-30 PROCEDURE — 6360000002 HC RX W HCPCS: Performed by: NURSE PRACTITIONER

## 2025-03-30 PROCEDURE — P9612 CATHETERIZE FOR URINE SPEC: HCPCS

## 2025-03-30 PROCEDURE — 99284 EMERGENCY DEPT VISIT MOD MDM: CPT

## 2025-03-30 PROCEDURE — 70450 CT HEAD/BRAIN W/O DYE: CPT

## 2025-03-30 PROCEDURE — 80053 COMPREHEN METABOLIC PANEL: CPT

## 2025-03-30 PROCEDURE — 83036 HEMOGLOBIN GLYCOSYLATED A1C: CPT

## 2025-03-30 RX ORDER — SODIUM CHLORIDE 0.9 % (FLUSH) 0.9 %
5-40 SYRINGE (ML) INJECTION PRN
Status: DISCONTINUED | OUTPATIENT
Start: 2025-03-30 | End: 2025-04-04 | Stop reason: HOSPADM

## 2025-03-30 RX ORDER — ONDANSETRON 2 MG/ML
4 INJECTION INTRAMUSCULAR; INTRAVENOUS EVERY 6 HOURS PRN
Status: DISCONTINUED | OUTPATIENT
Start: 2025-03-30 | End: 2025-04-04 | Stop reason: HOSPADM

## 2025-03-30 RX ORDER — MEMANTINE HYDROCHLORIDE 5 MG/1
5 TABLET ORAL DAILY
COMMUNITY
Start: 2025-03-07

## 2025-03-30 RX ORDER — ONDANSETRON 4 MG/1
4 TABLET, ORALLY DISINTEGRATING ORAL EVERY 8 HOURS PRN
Status: DISCONTINUED | OUTPATIENT
Start: 2025-03-30 | End: 2025-04-04 | Stop reason: HOSPADM

## 2025-03-30 RX ORDER — SODIUM CHLORIDE 0.9 % (FLUSH) 0.9 %
10 SYRINGE (ML) INJECTION AS NEEDED
Status: DISCONTINUED | OUTPATIENT
Start: 2025-03-30 | End: 2025-03-30 | Stop reason: HOSPADM

## 2025-03-30 RX ORDER — SODIUM CHLORIDE 9 MG/ML
INJECTION, SOLUTION INTRAVENOUS PRN
Status: DISCONTINUED | OUTPATIENT
Start: 2025-03-30 | End: 2025-04-03

## 2025-03-30 RX ORDER — POLYETHYLENE GLYCOL 3350 17 G/17G
17 POWDER, FOR SOLUTION ORAL DAILY PRN
Status: DISCONTINUED | OUTPATIENT
Start: 2025-03-30 | End: 2025-04-04 | Stop reason: HOSPADM

## 2025-03-30 RX ORDER — ASPIRIN 300 MG/1
300 SUPPOSITORY RECTAL DAILY
Status: DISCONTINUED | OUTPATIENT
Start: 2025-03-30 | End: 2025-04-04 | Stop reason: HOSPADM

## 2025-03-30 RX ORDER — TRAZODONE HYDROCHLORIDE 100 MG/1
100 TABLET ORAL NIGHTLY
COMMUNITY
Start: 2025-02-25

## 2025-03-30 RX ORDER — ENOXAPARIN SODIUM 100 MG/ML
40 INJECTION SUBCUTANEOUS DAILY
Status: DISCONTINUED | OUTPATIENT
Start: 2025-03-30 | End: 2025-04-04 | Stop reason: HOSPADM

## 2025-03-30 RX ORDER — SODIUM CHLORIDE 0.9 % (FLUSH) 0.9 %
5-40 SYRINGE (ML) INJECTION EVERY 12 HOURS SCHEDULED
Status: DISCONTINUED | OUTPATIENT
Start: 2025-03-30 | End: 2025-04-03

## 2025-03-30 RX ORDER — AMLODIPINE BESYLATE 5 MG/1
5 TABLET ORAL DAILY
COMMUNITY
Start: 2025-03-13

## 2025-03-30 RX ORDER — IOPAMIDOL 755 MG/ML
70 INJECTION, SOLUTION INTRAVASCULAR
Status: COMPLETED | OUTPATIENT
Start: 2025-03-30 | End: 2025-03-30

## 2025-03-30 RX ORDER — ATORVASTATIN CALCIUM 80 MG/1
80 TABLET, FILM COATED ORAL NIGHTLY
Status: DISCONTINUED | OUTPATIENT
Start: 2025-03-30 | End: 2025-04-04 | Stop reason: HOSPADM

## 2025-03-30 RX ORDER — ASPIRIN 81 MG/1
81 TABLET, CHEWABLE ORAL DAILY
Status: DISCONTINUED | OUTPATIENT
Start: 2025-03-30 | End: 2025-04-04 | Stop reason: HOSPADM

## 2025-03-30 RX ORDER — ATORVASTATIN CALCIUM 10 MG/1
10 TABLET, FILM COATED ORAL NIGHTLY
COMMUNITY
Start: 2025-03-11

## 2025-03-30 RX ORDER — IRBESARTAN 150 MG/1
150 TABLET ORAL DAILY
COMMUNITY
Start: 2025-03-12

## 2025-03-30 RX ORDER — ARIPIPRAZOLE 10 MG/1
10 TABLET ORAL DAILY
COMMUNITY
Start: 2025-03-04

## 2025-03-30 RX ORDER — DULOXETIN HYDROCHLORIDE 30 MG/1
30 CAPSULE, DELAYED RELEASE ORAL DAILY
COMMUNITY
Start: 2025-03-23

## 2025-03-30 RX ORDER — DONEPEZIL HYDROCHLORIDE 10 MG/1
10 TABLET, FILM COATED ORAL NIGHTLY
COMMUNITY
Start: 2025-03-25

## 2025-03-30 RX ADMIN — ENOXAPARIN SODIUM 40 MG: 100 INJECTION SUBCUTANEOUS at 23:32

## 2025-03-30 RX ADMIN — SODIUM CHLORIDE, PRESERVATIVE FREE 10 ML: 5 INJECTION INTRAVENOUS at 23:32

## 2025-03-30 RX ADMIN — ASPIRIN 81 MG: 81 TABLET, CHEWABLE ORAL at 23:32

## 2025-03-30 RX ADMIN — ATORVASTATIN CALCIUM 80 MG: 80 TABLET, FILM COATED ORAL at 23:32

## 2025-03-30 RX ADMIN — IOPAMIDOL 70 ML: 755 INJECTION, SOLUTION INTRAVENOUS at 19:22

## 2025-03-30 NOTE — ED PROVIDER NOTES
Subjective   History of Present Illness  65-year-old female is brought in by EMS for left-sided weakness.  Unsure when the last known well was.  Per the EMS she has been having left-sided weakness all week and worsened over the last couple days.  Nursing home reported left-sided weakness slurred speech and a fall.  Patient however states that the weakness is only in her left upper extremity and right upper extremity began today.  She states that began this morning.  Patient denies any headache.  No dizziness.  No visual disturbances.  No loss of sensation of the extremities.  Patient denies any other symptoms at this time.        Review of Systems   Neurological:  Positive for weakness.   All other systems reviewed and are negative.      Past Medical History:   Diagnosis Date    Back pain     COPD (chronic obstructive pulmonary disease)     Dementia     Enlarged heart     states provider told her was resolved on last follow up at age of 11 yrs       No Known Allergies    History reviewed. No pertinent surgical history.    History reviewed. No pertinent family history.    Social History     Socioeconomic History    Marital status: Single   Tobacco Use    Smoking status: Every Day     Current packs/day: 2.00     Average packs/day: 2.0 packs/day for 50.2 years (100.5 ttl pk-yrs)     Types: Cigarettes     Start date: 1975   Substance and Sexual Activity    Alcohol use: No    Sexual activity: Never           Objective   Physical Exam  Vitals and nursing note reviewed.   Constitutional:       Appearance: Normal appearance.   HENT:      Head: Normocephalic and atraumatic.      Mouth/Throat:      Mouth: Mucous membranes are moist.   Eyes:      Extraocular Movements: Extraocular movements intact.      Pupils: Pupils are equal, round, and reactive to light.   Cardiovascular:      Rate and Rhythm: Normal rate and regular rhythm.      Heart sounds: Normal heart sounds.   Pulmonary:      Effort: Pulmonary effort is normal.       Breath sounds: Normal breath sounds.   Abdominal:      General: Bowel sounds are normal.      Palpations: Abdomen is soft.      Tenderness: There is no abdominal tenderness.   Skin:     General: Skin is warm and dry.   Neurological:      General: No focal deficit present.      Mental Status: She is alert and oriented to person, place, and time.      Cranial Nerves: No cranial nerve deficit.      Sensory: No sensory deficit.      Motor: No weakness.      Coordination: Coordination normal.   Psychiatric:         Mood and Affect: Mood normal.         Behavior: Behavior normal.         Procedures           ED Course  ED Course as of 03/30/25 1437   Sun Mar 30, 2025   1137 NIH Stroke Scale/Score (NIHSS) - MDCalc  Calculated on Mar 30 2025 12:37 PM  0 points -> NIH Stroke Scale [RP]      ED Course User Index  [RP] Anatoliy Casillas MD                                Total (NIH Stroke Scale): 1                    Lab Results (last 24 hours)       Procedure Component Value Units Date/Time    POC Glucose Once [[id number]]  (Normal) Collected: 03/30/25 1129    Specimen: Blood Updated: 03/30/25 1139     Glucose 117 mg/dL      Comment: : [id number] Fung AngelaMeter ID: [id number]       CBC & Differential [[id number]]  (Abnormal) Collected: 03/30/25 1133    Specimen: Blood Updated: 03/30/25 1143    Narrative:      The following orders were created for panel order CBC & Differential.  Procedure                               Abnormality         Status                     ---------                               -----------         ------                     CBC Auto Differential[[id number]]        Abnormal            Final result                 Please view results for these tests on the individual orders.    Protime-INR [[id number]]  (Normal) Collected: 03/30/25 1133    Specimen: Blood Updated: 03/30/25 1152     Protime 14.4 Seconds      INR 1.06    aPTT [[id number]]  (Normal) Collected: 03/30/25 1133    Specimen: Blood Updated:  03/30/25 1152     PTT 31.6 seconds     Narrative:      PTT = The equivalent PTT values for the therapeutic range of heparin levels at 0.3 to 0.7 U/ml are 77 - 99 seconds.    CBC Auto Differential [[id number]]  (Abnormal) Collected: 03/30/25 1133    Specimen: Blood Updated: 03/30/25 1143     WBC 10.78 10*3/mm3      RBC 3.95 10*6/mm3      Hemoglobin 12.0 g/dL      Hematocrit 36.9 %      MCV 93.4 fL      MCH 30.4 pg      MCHC 32.5 g/dL      RDW 13.0 %      RDW-SD 44.8 fl      MPV 11.5 fL      Platelets 291 10*3/mm3      Neutrophil % 78.6 %      Lymphocyte % 14.9 %      Monocyte % 4.7 %      Eosinophil % 1.0 %      Basophil % 0.5 %      Immature Grans % 0.3 %      Neutrophils, Absolute 8.47 10*3/mm3      Lymphocytes, Absolute 1.61 10*3/mm3      Monocytes, Absolute 0.51 10*3/mm3      Eosinophils, Absolute 0.11 10*3/mm3      Basophils, Absolute 0.05 10*3/mm3      Immature Grans, Absolute 0.03 10*3/mm3      nRBC 0.0 /100 WBC     Comprehensive Metabolic Panel [[id number]]  (Abnormal) Collected: 03/30/25 1225    Specimen: Blood from Arm, Left Updated: 03/30/25 1252     Glucose 111 mg/dL      BUN 16 mg/dL      Creatinine 1.00 mg/dL      Sodium 138 mmol/L      Potassium 5.3 mmol/L      Chloride 106 mmol/L      CO2 23.0 mmol/L      Calcium 9.1 mg/dL      Total Protein 7.1 g/dL      Albumin 3.7 g/dL      ALT (SGPT) 11 U/L      AST (SGOT) 14 U/L      Alkaline Phosphatase 81 U/L      Total Bilirubin 0.5 mg/dL      Globulin 3.4 gm/dL      A/G Ratio 1.1 g/dL      BUN/Creatinine Ratio 16.0     Anion Gap 9.0 mmol/L      eGFR 62.3 mL/min/1.73     Narrative:      GFR Categories in Chronic Kidney Disease (CKD)      GFR Category          GFR (mL/min/1.73)    Interpretation  G1                     90 or greater         Normal or high (1)  G2                      60-89                Mild decrease (1)  G3a                   45-59                Mild to moderate decrease  G3b                   30-44                Moderate to severe  decrease  G4                    15-29                Severe decrease  G5                    14 or less           Kidney failure          (1)In the absence of evidence of kidney disease, neither GFR category G1 or G2 fulfill the criteria for CKD.    eGFR calculation 2021 CKD-EPI creatinine equation, which does not include race as a factor    High Sensitivity Troponin T [[id number]]  (Abnormal) Collected: 03/30/25 1225    Specimen: Blood from Arm, Left Updated: 03/30/25 1250     HS Troponin T 34 ng/L     Narrative:      High Sensitive Troponin T Reference Range:  <14.0 ng/L- Negative Female for AMI  <22.0 ng/L- Negative Male for AMI  >=14 - Abnormal Female indicating possible myocardial injury.  >=22 - Abnormal Male indicating possible myocardial injury.   Clinicians would have to utilize clinical acumen, EKG, Troponin, and serial changes to determine if it is an Acute Myocardial Infarction or myocardial injury due to an underlying chronic condition.         Magnesium [[id number]]  (Abnormal) Collected: 03/30/25 1225    Specimen: Blood from Arm, Left Updated: 03/30/25 1249     Magnesium 1.4 mg/dL     Urinalysis With Culture If Indicated - Urine, Catheter [[id number]]  (Abnormal) Collected: 03/30/25 1252    Specimen: Urine, Catheter Updated: 03/30/25 1313     Color, UA Yellow     Appearance, UA Clear     pH, UA 5.5     Specific Gravity, UA >=1.030     Glucose, UA Negative     Ketones, UA Negative     Bilirubin, UA Negative     Blood, UA Trace     Protein, UA >=300 mg/dL (3+)     Leuk Esterase, UA Small (1+)     Nitrite, UA Positive     Urobilinogen, UA 0.2 E.U./dL    Narrative:      In absence of clinical symptoms, the presence of pyuria, bacteria, and/or nitrites on the urinalysis result does not correlate with infection.    Urinalysis, Microscopic Only - Urine, Catheter [[id number]]  (Abnormal) Collected: 03/30/25 1252    Specimen: Urine, Catheter Updated: 03/30/25 1313     RBC, UA 0-2 /HPF      WBC, UA 3-5 /HPF       Comment: Urine culture not indicated.        Bacteria, UA Trace /HPF      Squamous Epithelial Cells, UA 13-20 /HPF      Hyaline Casts, UA None Seen /LPF      Methodology Manual Light Microscopy    High Sensitivity Troponin T 1Hr [[id number]]  (Abnormal) Collected: 03/30/25 1356    Specimen: Blood Updated: 03/30/25 1422     HS Troponin T 34 ng/L      Troponin T Numeric Delta 0 ng/L      Troponin T % Delta 0    Narrative:      High Sensitive Troponin T Reference Range:  <14.0 ng/L- Negative Female for AMI  <22.0 ng/L- Negative Male for AMI  >=14 - Abnormal Female indicating possible myocardial injury.  >=22 - Abnormal Male indicating possible myocardial injury.   Clinicians would have to utilize clinical acumen, EKG, Troponin, and serial changes to determine if it is an Acute Myocardial Infarction or myocardial injury due to an underlying chronic condition.               XR Chest 1 View   Final Result   Shallow inspiration, no acute cardiopulmonary abnormality.           This report was signed and finalized on 3/30/2025 12:22 PM by Dr. Lamont Capps MD.          CT Head Without Contrast   Final Result   1. No intracranial hemorrhage or acute findings. Advanced chronic small   vessel white matter ischemic disease and more focal chronic appearing   ischemic changes within the right periventricular white matter,   including the anterior limb of the right internal capsule and   subcortical white matter at the right frontal vertex.           This report was signed and finalized on 3/30/2025 12:42 PM by Dr. Lamont Capps MD.            Medications   sodium chloride 0.9 % flush 10 mL (has no administration in time range)       Medical Decision Making  65-year-old female here for weakness.  Patient's neuroexam was unremarkable.  Patient had an NIHSS of 0.  Patient be discharged home in stable condition.  All questions were answered with the patient prior to discharge.  Patient was advised to follow-up with primary care  provider.  Patient was advised to return the emergency room with new or worsening symptoms.  Patient verbalized understanding was agreeable to the plan as discussed.    Problems Addressed:  Weakness: complicated acute illness or injury    Amount and/or Complexity of Data Reviewed  External Data Reviewed: labs, radiology, ECG and notes.  Labs: ordered. Decision-making details documented in ED Course.  Radiology: ordered. Decision-making details documented in ED Course.  ECG/medicine tests: ordered. Decision-making details documented in ED Course.    Risk  Prescription drug management.        Final diagnoses:   Weakness       ED Disposition  ED Disposition       ED Disposition   Discharge    Condition   Stable    Comment   --               PATIENT CONNECTION - [facility] [id number]  [phone]             Medication List      No changes were made to your prescriptions during this visit.            Anatoliy Casillas MD  03/30/25 2885

## 2025-03-30 NOTE — ED PROVIDER NOTES
[facility] EMERGENCY DEPARTMENT  EMERGENCY DEPARTMENT ENCOUNTER      Pt Name: Sharmin Petersen  MRN: [id number]  Birthdate 1960  Date of evaluation: 3/30/2025  Provider: Jeff Evans Jr, MD    CHIEF COMPLAINT       Chief Complaint   Patient presents with    Extremity Weakness     Left side weakness LKW > 24 hours, seen at other facility and discharged today, sent back by nursing home d/t continued symptoms     Facial Droop         HISTORY OF PRESENT ILLNESS   (Location/Symptom, Timing/Onset,Context/Setting, Quality, Duration, Modifying Factors, Severity)  Note limiting factors.   Sharmin Petersen is a 65 y.o. female who presents to the emergency department for evaluation after having left-sided weakness.  History provided by EMS, nursing home staff, and medical records.  Unclear exactly when it started but EMS reports last known well sometime around the day before yesterday and nursing home staff noticed the weakness yesterday.  This morning, EMS transported patient to [facility].  They noted when they picked her up she had flaccid left-sided paralysis along with facial droop that seem to have significantly improved or resolved by the time they arrived at the hospital.  Patient had negative CT of the head at the hospital and was discharged.  Had recurrence of the weakness at the nursing home for which staff was concerned and wanted her further evaluated.  Patient says she has been unaware of any weakness and has only known about it because others have told her about it.  Started having headache behind her right eye but cannot member when it started.  Has history of dementia and prior CVA, as well as hyperlipidemia, hypertension, and COPD per medical records    HPI    NursingNotes were reviewed.    REVIEW OF SYSTEMS    (2-9 systems for level 4, 10 or more for level 5)     Review of Systems   Unable to perform ROS: Dementia   Constitutional: Negative.    HENT: Negative.     Eyes: Negative.    Respiratory: Negative.    which is difficult to measure secondary calcification.  Stenosis at least 90%.   3.  80% stenosis left proximal internal carotid artery   4.  Patent vertebral arteries        All CT scans are performed using dose optimization techniques as appropriate to the performed exam and include    at least one of the following: Automated exposure control, adjustment of the mA and/or kV according to size, and the use of iterative reconstruction technique.        ______________________________________    Electronically signed by: NILDA GONZALEZ M.D.   Date:     03/30/2025   Time:    19:48       XR CHEST PORTABLE   Final Result   No acute cardiopulmonary disease               ______________________________________    Electronically signed by: NILDA GONZALEZ M.D.   Date:     03/30/2025   Time:    19:27       CT HEAD WO CONTRAST   Final Result       No acute intracranial abnormality.       If symptoms persist, consider follow-up MRI brain.        All CT scans are performed using dose optimization techniques as appropriate to the performed exam and include    at least one of the following: Automated exposure control, adjustment of the mA and/or kV according to size, and the use of iterative reconstruction technique.        ______________________________________    Electronically signed by: CHARLY PARKER D.O.   Date:     03/30/2025   Time:    19:15             ED BEDSIDE ULTRASOUND:   Performed by ED Physician - none    LABS:  Labs Reviewed   CBC WITH AUTO DIFFERENTIAL - Abnormal; Notable for the following components:       Result Value    RBC 3.97 (*)     MCHC 32.7 (*)     Neutrophils % 76.1 (*)     Lymphocytes % 17.6 (*)     Neutrophils Absolute 7.9 (*)     All other components within normal limits   COMPREHENSIVE METABOLIC PANEL W/ REFLEX TO MG FOR LOW K - Abnormal; Notable for the following components:    Glucose 163 (*)     All other components within normal limits   TROPONIN - Abnormal; Notable for the following

## 2025-03-31 ENCOUNTER — APPOINTMENT (OUTPATIENT)
Dept: VASCULAR LAB | Age: 65
End: 2025-03-31
Attending: PSYCHIATRY & NEUROLOGY
Payer: MEDICARE

## 2025-03-31 ENCOUNTER — APPOINTMENT (OUTPATIENT)
Age: 65
End: 2025-03-31
Payer: MEDICARE

## 2025-03-31 ENCOUNTER — APPOINTMENT (OUTPATIENT)
Dept: MRI IMAGING | Age: 65
End: 2025-03-31
Payer: MEDICARE

## 2025-03-31 LAB
ANION GAP SERPL CALCULATED.3IONS-SCNC: 10 MMOL/L (ref 8–16)
BUN SERPL-MCNC: 15 MG/DL (ref 8–23)
CALCIUM SERPL-MCNC: 9 MG/DL (ref 8.8–10.2)
CHLORIDE SERPL-SCNC: 105 MMOL/L (ref 98–107)
CHOLEST SERPL-MCNC: 125 MG/DL (ref 0–199)
CO2 SERPL-SCNC: 23 MMOL/L (ref 22–29)
CREAT SERPL-MCNC: 0.9 MG/DL (ref 0.5–0.9)
ECHO AO ASC DIAM: 2.4 CM
ECHO AO ASCENDING AORTA INDEX: 1.28 CM/M2
ECHO AO ROOT DIAM: 2.2 CM
ECHO AO ROOT INDEX: 1.18 CM/M2
ECHO AO SINUS VALSALVA DIAM: 2.1 CM
ECHO AO SINUS VALSALVA INDEX: 1.12 CM/M2
ECHO AO ST JNCT DIAM: 2 CM
ECHO AV AREA PEAK VELOCITY: 1.3 CM2
ECHO AV AREA VTI: 1.1 CM2
ECHO AV AREA/BSA PEAK VELOCITY: 0.7 CM2/M2
ECHO AV AREA/BSA VTI: 0.6 CM2/M2
ECHO AV MEAN GRADIENT: 9 MMHG
ECHO AV MEAN VELOCITY: 1.4 M/S
ECHO AV PEAK GRADIENT: 19 MMHG
ECHO AV PEAK VELOCITY: 2.2 M/S
ECHO AV VELOCITY RATIO: 0.45
ECHO AV VTI: 51.5 CM
ECHO BSA: 1.92 M2
ECHO BSA: 1.92 M2
ECHO IVC PROX: 1.3 CM
ECHO LA AREA 2C: 18.4 CM2
ECHO LA AREA 4C: 16.3 CM2
ECHO LA DIAMETER INDEX: 1.98 CM/M2
ECHO LA DIAMETER: 3.7 CM
ECHO LA MAJOR AXIS: 4.7 CM
ECHO LA MINOR AXIS: 5.1 CM
ECHO LA TO AORTIC ROOT RATIO: 1.68
ECHO LA VOL BP: 52 ML (ref 22–52)
ECHO LA VOL MOD A2C: 55 ML (ref 22–52)
ECHO LA VOL MOD A4C: 45 ML (ref 22–52)
ECHO LA VOL/BSA BIPLANE: 28 ML/M2 (ref 16–34)
ECHO LA VOLUME INDEX MOD A2C: 29 ML/M2 (ref 16–34)
ECHO LA VOLUME INDEX MOD A4C: 24 ML/M2 (ref 16–34)
ECHO LV E' LATERAL VELOCITY: 5.18 CM/S
ECHO LV E' SEPTAL VELOCITY: 3.75 CM/S
ECHO LV EDV A2C: 133 ML
ECHO LV EDV A4C: 141 ML
ECHO LV EDV INDEX A4C: 75 ML/M2
ECHO LV EDV NDEX A2C: 71 ML/M2
ECHO LV EF PHYSICIAN: 34 %
ECHO LV EJECTION FRACTION A2C: 63 %
ECHO LV EJECTION FRACTION A4C: 50 %
ECHO LV ESV A2C: 50 ML
ECHO LV ESV A4C: 70 ML
ECHO LV ESV INDEX A2C: 27 ML/M2
ECHO LV ESV INDEX A4C: 37 ML/M2
ECHO LV FRACTIONAL SHORTENING: 34 % (ref 28–44)
ECHO LV INTERNAL DIMENSION DIASTOLE INDEX: 2.19 CM/M2
ECHO LV INTERNAL DIMENSION DIASTOLIC: 4.1 CM (ref 3.9–5.3)
ECHO LV INTERNAL DIMENSION SYSTOLIC INDEX: 1.44 CM/M2
ECHO LV INTERNAL DIMENSION SYSTOLIC: 2.7 CM
ECHO LV IVSD: 0.9 CM (ref 0.6–0.9)
ECHO LV MASS 2D: 114.1 G (ref 67–162)
ECHO LV MASS INDEX 2D: 61 G/M2 (ref 43–95)
ECHO LV POSTERIOR WALL DIASTOLIC: 0.9 CM (ref 0.6–0.9)
ECHO LV RELATIVE WALL THICKNESS RATIO: 0.44
ECHO LVOT AREA: 2.8 CM2
ECHO LVOT AV VTI INDEX: 0.37
ECHO LVOT DIAM: 1.9 CM
ECHO LVOT MEAN GRADIENT: 2 MMHG
ECHO LVOT PEAK GRADIENT: 4 MMHG
ECHO LVOT PEAK VELOCITY: 1 M/S
ECHO LVOT STROKE VOLUME INDEX: 29.1 ML/M2
ECHO LVOT SV: 54.4 ML
ECHO LVOT VTI: 19.2 CM
ECHO MV A VELOCITY: 1.03 M/S
ECHO MV AREA VTI: 1.5 CM2
ECHO MV E DECELERATION TIME (DT): 204 MS
ECHO MV E VELOCITY: 0.86 M/S
ECHO MV E/A RATIO: 0.83
ECHO MV E/E' LATERAL: 16.6
ECHO MV E/E' RATIO (AVERAGED): 19.77
ECHO MV E/E' SEPTAL: 22.93
ECHO MV LVOT VTI INDEX: 1.95
ECHO MV MAX VELOCITY: 1.3 M/S
ECHO MV MEAN GRADIENT: 2 MMHG
ECHO MV MEAN VELOCITY: 0.7 M/S
ECHO MV PEAK GRADIENT: 7 MMHG
ECHO MV VTI: 37.4 CM
ECHO RA AREA 4C: 8.7 CM2
ECHO RA END SYSTOLIC VOLUME APICAL 4 CHAMBER INDEX BSA: 9 ML/M2
ECHO RA VOLUME: 17 ML
ECHO RV BASAL DIMENSION: 3.3 CM
ECHO RV LONGITUDINAL DIMENSION: 5.6 CM
ECHO RV MID DIMENSION: 3.1 CM
ECHO RV TAPSE: 1.4 CM (ref 1.7–?)
ERYTHROCYTE [DISTWIDTH] IN BLOOD BY AUTOMATED COUNT: 12.8 % (ref 11.5–14.5)
GLUCOSE SERPL-MCNC: 101 MG/DL (ref 70–99)
HCT VFR BLD AUTO: 35.1 % (ref 37–47)
HDLC SERPL-MCNC: 61 MG/DL (ref 40–60)
HGB BLD-MCNC: 11.2 G/DL (ref 12–16)
LDLC SERPL CALC-MCNC: 41 MG/DL
MCH RBC QN AUTO: 30.6 PG (ref 27–31)
MCHC RBC AUTO-ENTMCNC: 31.9 G/DL (ref 33–37)
MCV RBC AUTO: 95.9 FL (ref 81–99)
PLATELET # BLD AUTO: 247 K/UL (ref 130–400)
PMV BLD AUTO: 11.5 FL (ref 9.4–12.3)
POTASSIUM SERPL-SCNC: 4.3 MMOL/L (ref 3.5–5)
QT INTERVAL: 440 MS
QTC INTERVAL: 471 MS
RBC # BLD AUTO: 3.66 M/UL (ref 4.2–5.4)
SODIUM SERPL-SCNC: 138 MMOL/L (ref 136–145)
TRIGL SERPL-MCNC: 115 MG/DL (ref 0–149)
VAS LEFT ARM BP DIA: 90 MMHG
VAS LEFT ARM BP: 172 MMHG
VAS LEFT CCA MID EDV: 11 CM/S
VAS LEFT CCA MID PSV: 55.8 CM/S
VAS LEFT CCA PROX EDV: 14.7 CM/S
VAS LEFT CCA PROX PSV: 124 CM/S
VAS LEFT ECA PSV: 193 CM/S
VAS LEFT ICA DIST EDV: 24.1 CM/S
VAS LEFT ICA DIST PSV: 74.2 CM/S
VAS LEFT ICA MID EDV: 76.3 CM/S
VAS LEFT ICA MID PSV: 246 CM/S
VAS LEFT ICA PROX EDV: 86.6 CM/S
VAS LEFT ICA PROX PSV: 225 CM/S
VAS LEFT VERTEBRAL EDV: 23.1 CM/S
VAS LEFT VERTEBRAL PSV: 85.5 CM/S
VAS RIGHT ARM BP DIA: 82 MMHG
VAS RIGHT ARM BP: 180 MMHG
VAS RIGHT CCA MID PSV: 48.6 CM/S
VAS RIGHT CCA PROX PSV: 104 CM/S
VAS RIGHT ECA EDV: 15.4 CM/S
VAS RIGHT ECA PSV: 164 CM/S
VAS RIGHT ICA DIST EDV: 14.7 CM/S
VAS RIGHT ICA DIST PSV: 37.8 CM/S
VAS RIGHT ICA MID EDV: 106 CM/S
VAS RIGHT ICA MID PSV: 308 CM/S
VAS RIGHT ICA PROX PSV: 25.6 CM/S
VAS RIGHT VERTEBRAL EDV: 10.2 CM/S
VAS RIGHT VERTEBRAL PSV: 37.2 CM/S
WBC # BLD AUTO: 8.7 K/UL (ref 4.8–10.8)

## 2025-03-31 PROCEDURE — 93306 TTE W/DOPPLER COMPLETE: CPT | Performed by: INTERNAL MEDICINE

## 2025-03-31 PROCEDURE — 97530 THERAPEUTIC ACTIVITIES: CPT

## 2025-03-31 PROCEDURE — 93880 EXTRACRANIAL BILAT STUDY: CPT | Performed by: SURGERY

## 2025-03-31 PROCEDURE — 97161 PT EVAL LOW COMPLEX 20 MIN: CPT

## 2025-03-31 PROCEDURE — 6360000004 HC RX CONTRAST MEDICATION: Performed by: NURSE PRACTITIONER

## 2025-03-31 PROCEDURE — 99223 1ST HOSP IP/OBS HIGH 75: CPT | Performed by: PSYCHIATRY & NEUROLOGY

## 2025-03-31 PROCEDURE — 92610 EVALUATE SWALLOWING FUNCTION: CPT

## 2025-03-31 PROCEDURE — 80061 LIPID PANEL: CPT

## 2025-03-31 PROCEDURE — 6370000000 HC RX 637 (ALT 250 FOR IP): Performed by: PSYCHIATRY & NEUROLOGY

## 2025-03-31 PROCEDURE — 6360000002 HC RX W HCPCS: Performed by: NURSE PRACTITIONER

## 2025-03-31 PROCEDURE — 85027 COMPLETE CBC AUTOMATED: CPT

## 2025-03-31 PROCEDURE — 70551 MRI BRAIN STEM W/O DYE: CPT

## 2025-03-31 PROCEDURE — 2500000003 HC RX 250 WO HCPCS: Performed by: NURSE PRACTITIONER

## 2025-03-31 PROCEDURE — 36415 COLL VENOUS BLD VENIPUNCTURE: CPT

## 2025-03-31 PROCEDURE — 80048 BASIC METABOLIC PNL TOTAL CA: CPT

## 2025-03-31 PROCEDURE — 97165 OT EVAL LOW COMPLEX 30 MIN: CPT

## 2025-03-31 PROCEDURE — 1200000000 HC SEMI PRIVATE

## 2025-03-31 PROCEDURE — C8929 TTE W OR WO FOL WCON,DOPPLER: HCPCS

## 2025-03-31 PROCEDURE — 94760 N-INVAS EAR/PLS OXIMETRY 1: CPT

## 2025-03-31 PROCEDURE — 6370000000 HC RX 637 (ALT 250 FOR IP): Performed by: NURSE PRACTITIONER

## 2025-03-31 PROCEDURE — 93880 EXTRACRANIAL BILAT STUDY: CPT

## 2025-03-31 RX ORDER — CLOPIDOGREL BISULFATE 75 MG/1
75 TABLET ORAL DAILY
Status: DISCONTINUED | OUTPATIENT
Start: 2025-03-31 | End: 2025-04-04 | Stop reason: HOSPADM

## 2025-03-31 RX ADMIN — CLOPIDOGREL BISULFATE 75 MG: 75 TABLET, FILM COATED ORAL at 09:21

## 2025-03-31 RX ADMIN — SULFUR HEXAFLUORIDE 2 ML: 60.7; .19; .19 INJECTION, POWDER, LYOPHILIZED, FOR SUSPENSION INTRAVENOUS; INTRAVESICAL at 07:38

## 2025-03-31 RX ADMIN — SODIUM CHLORIDE, PRESERVATIVE FREE 10 ML: 5 INJECTION INTRAVENOUS at 09:21

## 2025-03-31 RX ADMIN — ENOXAPARIN SODIUM 40 MG: 100 INJECTION SUBCUTANEOUS at 19:09

## 2025-03-31 RX ADMIN — ATORVASTATIN CALCIUM 80 MG: 80 TABLET, FILM COATED ORAL at 20:38

## 2025-03-31 RX ADMIN — ASPIRIN 81 MG: 81 TABLET, CHEWABLE ORAL at 09:21

## 2025-03-31 ASSESSMENT — PAIN SCALES - GENERAL: PAINLEVEL_OUTOF10: 0

## 2025-03-31 NOTE — ED NOTES
ED TO INPATIENT SBAR HANDOFF    Patient Name: Sharmin Petersen   : 1960  65 y.o.   Family/Caregiver Present: No  Code Status Order: Full Code    C-SSRS: Risk of Suicide: No Risk  Sitter No  Restraints:         Situation  Chief Complaint:   Chief Complaint   Patient presents with    Extremity Weakness     Left side weakness LKW > 24 hours, seen at other facility and discharged today, sent back by nursing home d/t continued symptoms     Facial Droop     Patient Diagnosis: Stroke determined by clinical assessment ([facility]) [I63.9]     Brief Description of Patient's Condition: The patient is a 65 y.o. female who presented to [facility] ED for evaluation of stroke like symptoms. Pt has history of copd, stroke, dementia, hypertension and hyperlipidemia.      Pt is here with her son and granddaughter who provide history. Pt's son tells me that patient was noted to have left sided weakness yesterday at nursing home by his daughter. He relates that he went to check on her today and had taken a picture of her in a wheelchair noting left facial weakness at that time. Pt was evaluated at [facility] ED earlier has had initially had improvement in weakness of left arm however returned and brought to this ED tonight when her weakness seemed to haveb gotten worse again.      In ED, CT head-No acute intracranial abnormality. CTA head/neck-.  No large vessel occlusion seen in the intracranial circulation.  There is some stenosis of the right cavernous carotid artery and right middle cerebral artery at least 50%. High-grade stenosis of the right proximal internal carotid artery which is difficult to measure secondary calcification.  Stenosis at least 90%. 80% stenosis left proximal internal carotid artery.    Pt is in brief, able to lift hips for bedpan, able to use both arms at this time. Pt is alert to self but does not always answer questions appropriately, ex.  can not tell me year or president.  Mental Status: alert and able to  71       NPO? No  O2 Flow Rate: O2 Device: None (Room air)    Cardiac Rhythm:   NIH Score: NIH NIH Stroke Scale  Level of Consciousness (1a): Alert  LOC Questions (1b): Answers one correctly  LOC Commands (1c): Performs both tasks correctly  Best Gaze (2): Normal  Visual (3): No visual loss  Facial Palsy (4): Normal symmetrical movement  Motor Arm, Left (5a): Drift, but does not hit bed  Motor Arm, Right (5b): Drift, but does not hit bed  Motor Leg, Left (6a): Drift, but does not hit bed  Motor Leg, Right (6b): Drift, but does not hit bed  Limb Ataxia (7): Absent  Sensory (8): Normal  Dysarthria (10): Normal  Extinction and Inattention (11): No abnormality   Active LDA's:    Pertinent or High Risk Medications/Drips: no   If Yes, please provide details:   Blood Product Administration:   If Yes, please provide details:   Sepsis Risk Score      Admitted with Sepsis? No    Recommendation  Incomplete orders:   Patient Belongings:   Additional Comments:   If any further questions, please call Sending RN at [phone]    Electronically signed by: Electronically signed by Mary Xiong RN on 3/30/2025 at 10:03 PM

## 2025-03-31 NOTE — CONSULTS
Pupils react to light  Ear, nose, throat -hearing intact to finger rub No scars, masses, or lesions over external nose or ears, no atrophy of tongue  Neck-symmetric, no masses noted, no jugular vein distension  Respiration- chest wall appears symmetric, good expansion,   normal effort without use of accessory muscles  Musculoskeletal - no significant wasting of muscles noted, no bony deformities  Extremities-no clubbing, cyanosis or edema  Skin - warm, dry, and intact. No rash, erythema, or pallor.  Psychiatric - mood, affect, and behavior appear normal.   Neurological exam  Awake, alert, fluent oriented x 3 although she initially guessed it was February rather than March.  Had some mild trouble with right/left commands.  Appears to have some psychomotor slowing.  Attention and concentration appear appropriate  Recent and remote memory appears unremarkable  Speech normal without dysarthria  No clear issues with language of fund of knowledge  Cranial Nerve Exam   CN II- Visual fields appear to show a field cut in the left upper and lower fields  CN III, IV,VI-EOMI, No nystagmus, conjugate eye movements, no ptosis  CN V-sensation intact to LT over face  CN VII-no facial assymetry  CN VIII-Hearing intact to voice  CN IX and X- Palate elevates in midline  CN XI-good shoulder shrug  CN XII-Tongue midline with no fasciculations or fibrillations  Motor Exam she has a pronator drift on the left.  4/5 weakness left arm.  Relatively normal in the left leg  Sensory Exam  Sensation intact to light touch and temperature upper and lower extremities bilaterally  Reflexes absent throughout  Tremors- no tremors in hands or head noted  Gait  Not tested  Coordination  Finger to nose-unremarkable on the right.  Clumsy on the left  ?  ?  CBC:   Recent Labs     03/30/25  1823 03/31/25  0452   WBC 10.3 8.7   HGB 12.2 11.2*    247     BMP:   Recent Labs     03/30/25  1823 03/31/25  0452    138   K 4.8 4.3    105   CO2 22  23   BUN 16 15   CREATININE 0.9 0.9   GLUCOSE 163* 101*     Hepatic:   Recent Labs     03/30/25  1823   AST 18   ALT 10   BILITOT 0.5   ALKPHOS 88     Lipids:   Recent Labs     03/31/25  0452   CHOL 125   HDL 61*     INR:   Recent Labs     03/30/25  1823   INR 1.16     ?  ?  Assessment and Plan   1.  65-year-old patient with a history of dementia having some left-sided weakness and a field cut on that side suspicious for a right MCA distribution stroke.  CTA showing high-grade right and left proximal carotid stenosis.  The 1 on the right would appear to be symptomatic.  Agree with aspirin/Plavix for now.  Vascular surgery consulted.  Await results of MRI and echocardiogram.  ?  ?      Carson Waldron MD  Board Certified in Neurology

## 2025-03-31 NOTE — PROGRESS NOTES
Physical Therapy  Facility/Department: [facility] SURG SERVICES  Physical Therapy Initial Assessment    Name: Sharmin Petersen  : 1960  MRN: [id number]  Date of Service: 3/31/2025    Discharge Recommendations:  Patient would benefit from continued therapy after discharge (ANTICIPATE DC BACK TO SNF FOR REHAB)          Patient Diagnosis(es): The primary encounter diagnosis was Acute left-sided weakness. A diagnosis of Cerebrovascular accident (CVA), unspecified mechanism (HCC) was also pertinent to this visit.  Past Medical History:  has a past medical history of COPD (chronic obstructive pulmonary disease) (HCC), CVA (cerebral vascular accident) (HCC), Dementia (HCC), Diabetes mellitus (HCC), Hyperlipidemia, Hypertension, and Neurocognitive disorder.  Past Surgical History:  has no past surgical history on file.    Assessment  Body Structures, Functions, Activity Limitations Requiring Skilled Therapeutic Intervention: Decreased functional mobility ;Decreased cognition;Decreased safe awareness;Decreased strength;Decreased balance;Decreased fine motor control  Assessment: pt WOULD BENEFIT FROM SKILLED PT IN THIS SETTING TO ADDRESS HER MOBILITY/STRENGTH DEFICITS  Therapy Prognosis: Fair  Decision Making: Low Complexity  Requires PT Follow-Up: Yes  Activity Tolerance  Activity Tolerance: Patient tolerated treatment well    Plan  Physical Therapy Plan  General Plan: 5-7 times per week  Therapy Duration: 2 Weeks  Current Treatment Recommendations: Strengthening, Balance training, Functional mobility training, Transfer training, Gait training, Safety education & training, Pain management, Positioning, Patient/Caregiver education & training, Therapeutic activities, Neuromuscular re-education  Additional Comments: ASSIST OF TWO INITIALLY. CONTINUE USE OF ABDIAS STEDY AND PROGRESS TO USE OF AD AS INDICATED  Safety Devices  Type of Devices: Call light within reach, Chair alarm in place, Gait belt, Nurse notified, Left in

## 2025-03-31 NOTE — PROGRESS NOTES
therapeutic activity recommendations, positioning,and safety recommendations        Tx initiated: Pt was willing to work with therapy. Pt displayed L UE deficits following stroke affecting strength, AROM, and coordination. Pt displayed balance concerns during standing and t/fers. Pt would require continued OT services. (15 minutes)   ANDREW Hoover/L  Electronically signed by Geeta HAN on 3/31/2025 at 11:03 AM.

## 2025-03-31 NOTE — H&P
[facility]      Hospitalist - History & Physical      PCP: No primary care provider on file.    Date of Admission: 3/30/2025    Date of Service: 3/30/2025    Chief Complaint: Stroke like symptoms     History Of Present Illness:   The patient is a 65 y.o. female who presented to [facility] ED for evaluation of stroke like symptoms. Pt has history of copd, stroke, dementia, hypertension and hyperlipidemia.     Pt is here with her son and granddaughter who provide history. Pt's son tells me that patient was noted to have left sided weakness yesterday at nursing home by his daughter. He relates that he went to check on her today and had taken a picture of her in a wheelchair noting left facial weakness at that time. [facility] has had initially had improvement in weakness of left arm however returned and brought to this ED tonight when her weakness seemed to haveb gotten worse again.     In ED, CT head-No acute intracranial abnormality. CTA head/neck-.  No large vessel occlusion seen in the intracranial circulation.  There is some stenosis of the right cavernous carotid artery and right middle cerebral artery at least 50%. High-grade stenosis of the right proximal internal carotid artery which is difficult to measure secondary calcification.  Stenosis at least 90%. 80% stenosis left proximal internal carotid artery   Pt is admitted inpatient to hospitalist.    Past Medical History:        Diagnosis Date    COPD (chronic obstructive pulmonary disease) (HCC)     CVA (cerebral vascular accident) (HCC)     Dementia (HCC)     Diabetes mellitus (HCC)     Hyperlipidemia     Hypertension     Neurocognitive disorder        Past Surgical History:    History reviewed. No pertinent surgical history.    Home Medications:  Prior to Admission medications    Medication Sig Start Date End Date Taking? Authorizing Provider   diclofenac (VOLTAREN) 50 MG EC tablet Take 1 tablet by mouth 2 times daily  consolidation is identified. No pneumothorax or effusion is identified. Heart size is normal. The osseous structures show no acute findings. Calcification in the subacromial interval of the right shoulder may be related to calcific tendinitis or HADD.    Shallow inspiration, no acute cardiopulmonary abnormality. This report was signed and finalized on 3/30/2025 12:22 PM by Dr. Austin Adams MD.      Assessment/Plan:  Principal Problem:    Stroke determined by clinical assessment ([facility])  Resolved Problems:    * No resolved hospital problems. *     Principal Problem:    Stroke determined by clinical assessment/Left hemiplegia  -telemetry  -mri brain  -echo with bubble study  -asa/statin  -neuro checks  -elevate hob  -neuro consult  -aggressive pt/ot/slp  -hba1c/flp/tsh  -I's and O's  -daily weight  -npo until bedside swallow assessment     Dementia  -fall precautions      Abnormal CTA head/neck   -consideration for vascular specialist consult   Resolved Problems:    * No resolved hospital problems. *  Signed:  MINH Fung - CNP, 3/30/2025 8:43 PM    I reviewed the patient's medical history, the NP findings on physical examination, the patient's diagnoses, and treatment plan as documented in the NP note.  I concur with the treatment plan as documented.

## 2025-03-31 NOTE — PROGRESS NOTES
4 Eyes Skin Assessment     NAME:  Sharmin Petersen  YOB: 1960  MEDICAL RECORD NUMBER:  [id number]    The patient is being assessed for  Admission    I agree that at least one RN has performed a thorough Head to Toe Skin Assessment on the patient. ALL assessment sites listed below have been assessed.      Areas assessed by both nurses:    Head, Face, Ears, Shoulders, Back, Chest, Arms, Elbows, Hands, Sacrum. Buttock, Coccyx, Ischium, Legs. Feet and Heels, and Under Medical Devices         Does the Patient have a Wound? No noted wound(s)       Ravinder Prevention initiated by RN: Yes  Wound Care Orders initiated by RN: No    Pressure Injury (Stage 3,4, Unstageable, DTI, NWPT, and Complex wounds) if present, place Wound referral order by RN under : No    New Ostomies, if present place, Ostomy referral order under : No     Nurse 1 eSignature: Electronically signed by Maribeth Rosario RN on 3/31/25 at 2:02 AM CDT    **SHARE this note so that the co-signing nurse can place an eSignature**    Nurse 2 eSignature: Electronically signed by Shania Donato RN on 3/31/25 at 4:32 AM CDT

## 2025-03-31 NOTE — PROGRESS NOTES
[facility]      [facility]    Patient:  Sharmin Petersen  YOB: 1960  Date of Service: 3/31/2025  MRN: [id number]   Acct: [id number]   Primary Care Physician: No primary care provider on file.  Advance Directive: Full Code  Admit Date: 3/30/2025       Hospital Day: 1    Portions of this note have been copied forward, however, updated to reflect the most current clinical status of this patient.     CHIEF COMPLAINT Stroke like symptoms    SUBJECTIVE:  Ms. Petersen was resting in chair this morning. A&O to self and place, disoriented to time. Delayed responses at times. Confused at times.       CUMULATIVE HOSPITAL COURSE:   The patient is a 65 y.o. female with PMH of COPD, stroke, Dementia, hypertension and hyperlipidemia who presented to [facility] ED for evaluation of strokelike symptoms.  Reportedly patient was noted to have left-sided weakness day prior to admission at nursing home. Reportedly was seen at OSH ED earlier and had improvement initially. Was noted to have return of left arm weakness and brought to [facility] ED for further evaluation. Workup in ED revealed CT head-No acute intracranial abnormality. CTA head/neck-.  No large vessel occlusion seen in the intracranial circulation.  There is some stenosis of the right cavernous carotid artery and right middle cerebral artery at least 50%. High-grade stenosis of the right proximal internal carotid artery which is difficult to measure secondary calcification.  Stenosis at least 90%. 80% stenosis left proximal internal carotid artery. Patient was admitted to hospital medicine for further evaluation with neurology consultation. Neurology suggested suspicious for a right MCA distribution stroke. Agreed with ASA and Plavix for now. Recommended Vascular surgery consultation and await MRI and ECHO results. Preliminary results of CD right ICA 70-99% stenosis, left ICA 50-69% stenosis. ECHO indicated markedly reduced LV systolic function with EF of 30 to 35%,

## 2025-03-31 NOTE — PROGRESS NOTES
Vascular lab preliminary.  Bilateral carotid duplex scan completed.  Right ICA 70-99% stenosis.  Left ICA  50-69% stenosis.  Bilateral vertebral arteries antegrade flow.  Final report pending.

## 2025-03-31 NOTE — CARE COORDINATION
Pt is a resident from [facility] in [place]. Pt is able to DC back once medically cleared.     [place]   P  [phone] F  Electronically signed by Thang Ricardo on 3/31/2025 at 12:58 PM

## 2025-03-31 NOTE — PROGRESS NOTES
Facility/Department: [facility] SURG SERVICES   CLINICAL BEDSIDE SWALLOW EVALUATION    NAME: Sharmin Petersen  : 1960  MRN: [id number]    ADMISSION DATE: 3/30/2025  ADMITTING DIAGNOSIS: has Stroke determined by clinical assessment ([facility]) on their problem list.    Date of Eval: 3/31/2025  Evaluating Therapist: CHERELLE Arroyo    Current Diet level:  NPO    Pain:  Pain Assessment  Pain Assessment: 0-10  Pain Level: 0  Response to Pain Intervention: Patient satisfied    Reason for Referral  Sharmin Petersen was referred for a bedside swallow evaluation to assess the efficiency of her swallow function, identify signs and symptoms of aspiration and make recommendations regarding safe dietary consistencies, effective compensatory strategies, and safe eating environment.    Impression  Assessed patient's swallowing function. Patient exhibited slow, decreased oral prep of more solid consistencies and sluggish, inconsistently mildly decreased laryngeal elevation for swallow airway protection. It is noted that inconsistent delayed coughs were observed with every consistency presented during evaluation this date (ice chips;puree;regular solid;nectar - cup;thin - cup). However, do not feel that all coughs were related to penetration/aspiration (secondary to timing of swallows and presence of throat movement and patient exhibited coughs inconsistently at rest).    At this time, do suspect delayed epiglottic inversion. Would however trial easy to chew consistency with least restrictive thin liquids. Assist in tray set-up. Recommend meds whole in pudding/applesauce. Will continue to follow. Thank you for this consult.     Treatment Plan  Requires SLP Intervention: Yes     Recommended Diet and Intervention  Diet Solids Recommendation: Easy to chew  Liquid Consistency Recommendation: Thin  Recommended Form of Meds: Meds whole in puree as able  Therapeutic Interventions: Patient/Family education;Diet tolerance monitoring;Therapeutic PO  observations noted:     Oral Phase  Mastication: Ice chips;Regular solid (Patient exhibited slow vertical jaw movement at the front of the mouth during oral prep of ice chip trials, presented by SLP, and regular solid consistency trials presented independently.)  Suspected Premature Bolus Loss: Puree;Regular solid (Oral transit of puree consistency trials, presented independently, and regular solid consistency trials primarily varied from 1-3 seconds in length.)  Oral Phase - Comment: Oral transit of ice chip trials primarily measured 1-2 seconds in length. Min puree consistency residue and min regular solid consistency residue was observed post swallows; all residue cleared from the mouth with additional dry swallows. Oral transit of nectar thick liquid trials and thin H2O trials, presented independently  via cup, primarily measured 1-2 seconds in length. It is noted that patient appeared slow and unsteady when attempting to bring food/drink trials to self.     Pharyngeal Phase  Suspected Swallow Delay: Puree;Regular solid (Suspect secondary to oral transit times.)  Laryngeal Elevation: (Patient exhibited sluggish, inconsistently mildly decreased laryngeal elevation for swallow airway protection.)  Delayed Cough: All   Pharyngeal Phase - Comment: It is noted that inconsistent delayed coughs were observed with every consistency presented during evaluation this date (ice chips;puree;regular solid;nectar - cup;thin - cup). However, do not feel that all coughs were related to penetration/aspiration (secondary to timing of swallows and presence of throat movement and patient exhibited coughs inconsistently at rest).    At this time, do suspect delayed epiglottic inversion. Would however trial easy to chew consistency with least restrictive thin liquids. Assist in tray set-up. Recommend meds whole in pudding/applesauce. Will continue to follow.     Electronically signed by CHERELLE Arroyo on 3/31/2025 at 1:29 PM

## 2025-04-01 PROBLEM — R53.1 ACUTE LEFT-SIDED WEAKNESS: Status: ACTIVE | Noted: 2025-04-01

## 2025-04-01 PROBLEM — I50.21 ACUTE SYSTOLIC CONGESTIVE HEART FAILURE (HCC): Status: ACTIVE | Noted: 2025-04-01

## 2025-04-01 LAB
ANION GAP SERPL CALCULATED.3IONS-SCNC: 8 MMOL/L (ref 8–16)
BUN SERPL-MCNC: 17 MG/DL (ref 8–23)
CALCIUM SERPL-MCNC: 9.2 MG/DL (ref 8.8–10.2)
CHLORIDE SERPL-SCNC: 105 MMOL/L (ref 98–107)
CO2 SERPL-SCNC: 25 MMOL/L (ref 22–29)
CREAT SERPL-MCNC: 1 MG/DL (ref 0.5–0.9)
ERYTHROCYTE [DISTWIDTH] IN BLOOD BY AUTOMATED COUNT: 12.7 % (ref 11.5–14.5)
GLUCOSE SERPL-MCNC: 101 MG/DL (ref 70–99)
HCT VFR BLD AUTO: 35.3 % (ref 37–47)
HGB BLD-MCNC: 11.5 G/DL (ref 12–16)
MCH RBC QN AUTO: 30.6 PG (ref 27–31)
MCHC RBC AUTO-ENTMCNC: 32.6 G/DL (ref 33–37)
MCV RBC AUTO: 93.9 FL (ref 81–99)
PLATELET # BLD AUTO: 231 K/UL (ref 130–400)
PMV BLD AUTO: 11.1 FL (ref 9.4–12.3)
POTASSIUM SERPL-SCNC: 4.3 MMOL/L (ref 3.5–5)
RBC # BLD AUTO: 3.76 M/UL (ref 4.2–5.4)
SODIUM SERPL-SCNC: 138 MMOL/L (ref 136–145)
WBC # BLD AUTO: 8 K/UL (ref 4.8–10.8)

## 2025-04-01 PROCEDURE — 85027 COMPLETE CBC AUTOMATED: CPT

## 2025-04-01 PROCEDURE — 80048 BASIC METABOLIC PNL TOTAL CA: CPT

## 2025-04-01 PROCEDURE — 36415 COLL VENOUS BLD VENIPUNCTURE: CPT

## 2025-04-01 PROCEDURE — 1200000000 HC SEMI PRIVATE

## 2025-04-01 PROCEDURE — 6370000000 HC RX 637 (ALT 250 FOR IP): Performed by: PSYCHIATRY & NEUROLOGY

## 2025-04-01 PROCEDURE — 94760 N-INVAS EAR/PLS OXIMETRY 1: CPT

## 2025-04-01 PROCEDURE — 6370000000 HC RX 637 (ALT 250 FOR IP): Performed by: NURSE PRACTITIONER

## 2025-04-01 PROCEDURE — 99222 1ST HOSP IP/OBS MODERATE 55: CPT | Performed by: INTERNAL MEDICINE

## 2025-04-01 PROCEDURE — 6360000002 HC RX W HCPCS: Performed by: NURSE PRACTITIONER

## 2025-04-01 PROCEDURE — 6370000000 HC RX 637 (ALT 250 FOR IP): Performed by: INTERNAL MEDICINE

## 2025-04-01 PROCEDURE — 97530 THERAPEUTIC ACTIVITIES: CPT

## 2025-04-01 PROCEDURE — 2500000003 HC RX 250 WO HCPCS: Performed by: NURSE PRACTITIONER

## 2025-04-01 PROCEDURE — 99233 SBSQ HOSP IP/OBS HIGH 50: CPT | Performed by: PSYCHIATRY & NEUROLOGY

## 2025-04-01 RX ORDER — LISINOPRIL 5 MG/1
2.5 TABLET ORAL DAILY
Status: DISCONTINUED | OUTPATIENT
Start: 2025-04-01 | End: 2025-04-04 | Stop reason: HOSPADM

## 2025-04-01 RX ORDER — METOPROLOL SUCCINATE 25 MG/1
25 TABLET, EXTENDED RELEASE ORAL DAILY
Status: DISCONTINUED | OUTPATIENT
Start: 2025-04-01 | End: 2025-04-03 | Stop reason: SDUPTHER

## 2025-04-01 RX ADMIN — ASPIRIN 81 MG: 81 TABLET, CHEWABLE ORAL at 07:44

## 2025-04-01 RX ADMIN — METOPROLOL SUCCINATE 25 MG: 25 TABLET, EXTENDED RELEASE ORAL at 14:55

## 2025-04-01 RX ADMIN — ATORVASTATIN CALCIUM 80 MG: 80 TABLET, FILM COATED ORAL at 22:29

## 2025-04-01 RX ADMIN — SODIUM CHLORIDE, PRESERVATIVE FREE 10 ML: 5 INJECTION INTRAVENOUS at 07:43

## 2025-04-01 RX ADMIN — CLOPIDOGREL BISULFATE 75 MG: 75 TABLET, FILM COATED ORAL at 07:44

## 2025-04-01 RX ADMIN — ENOXAPARIN SODIUM 40 MG: 100 INJECTION SUBCUTANEOUS at 17:00

## 2025-04-01 RX ADMIN — LISINOPRIL 2.5 MG: 5 TABLET ORAL at 14:55

## 2025-04-01 RX ADMIN — SODIUM CHLORIDE, PRESERVATIVE FREE 10 ML: 5 INJECTION INTRAVENOUS at 22:29

## 2025-04-01 ASSESSMENT — PAIN SCALES - GENERAL: PAINLEVEL_OUTOF10: 0

## 2025-04-01 NOTE — PROGRESS NOTES
Physical Therapy  Name: Sharmin Petersen  MRN:  [id number]  Date of service:  4/1/2025 04/01/25 1142   Subjective   Subjective Pt agreed to therapy.   General   General Comments Nurse okayed therapy.   Pain Assessment   Pain Assessment None - Denies Pain   Bed Mobility   Supine to Sit Minimal assistance   Transfers   Sit to Stand Minimal Assistance  (1+1)   Stand to Sit Minimal Assistance   Comment stood EOB with HW 1x but pt not able to correct posterior lean or take a step; transferred with SS to chair   Balance   Comments pt stood in SS and able to  march in place holding to safety bar   Short Term Goals   Time Frame for Short Term Goals 2 WKS   Short Term Goal 1 SUP<>SIT, MIN A   Short Term Goal 2 SIT<>STAND, MIN A   Short Term Goal 3 TF'S WITH AD AS INDICATED, MIN A   Short Term Goal 4 AMB 25 FT WITH AD AS INDICATED, MIN A   Conditions Requiring Skilled Therapeutic Intervention   Body Structures, Functions, Activity Limitations Requiring Skilled Therapeutic Intervention Decreased functional mobility ;Decreased cognition;Decreased safe awareness;Decreased strength;Decreased balance;Decreased fine motor control   Assessment Pt had improvement with bed mobiltiy and sitting balance this date, able to maintain sitting at EOB without support. Pt able to march in place standing in [facility] providing pt with mroe support and confidence. Assisted up to chair with all needs in reach.   Activity Tolerance   Activity Tolerance Patient tolerated treatment well   PT Plan of Care   Tuesday X   Safety Devices   Type of Devices Call light within reach;Chair alarm in place;Left in chair           Electronically signed by Shilpa Fine PTA on 4/1/2025 at 11:59 AM

## 2025-04-01 NOTE — CARE COORDINATION
Patient's granddaughter has requested a referral to [facility]. Referral sent.  [facility]   P   F ()   F (Ref to Elizabeth Bull)  Electronically signed by Aditya Armstrong RN, BSN on 4/1/2025 at 9:53 AM

## 2025-04-01 NOTE — CARE COORDINATION
Patient's granddaughter have accepted offer from [facility]. Patient can discharge 04/02 if medically stable.  [facility]   P   F ()   F (Ref to Elizabeth Bull)  Electronically signed by Aditya Armstrong RN, BSN on 4/1/2025 at 1:29 PM

## 2025-04-01 NOTE — PROGRESS NOTES
[facility]      [facility]    Patient:  Sharmin Petersen  YOB: 1960  Date of Service: 4/1/2025  MRN: [id number]   Acct: [id number]   Primary Care Physician: No primary care provider on file.  Advance Directive: Full Code  Admit Date: 3/30/2025       Hospital Day: 2    Portions of this note have been copied forward, however, updated to reflect the most current clinical status of this patient.     CHIEF COMPLAINT Stroke like symptoms    SUBJECTIVE:  Ms. Petersen was resting in bed this morning. Reports LUE and LLE weakness improving. A&O to self  and place, disoriented to time.       CUMULATIVE HOSPITAL COURSE:   The patient is a 65 y.o. female with PMH of COPD, stroke, Dementia, hypertension and hyperlipidemia who presented to [facility] ED for evaluation of strokelike symptoms.  Reportedly patient was noted to have left-sided weakness day prior to admission at nursing home. Reportedly was seen at OSH ED earlier and had improvement initially. Was noted to have return of left arm weakness and brought to [facility] ED for further evaluation. Workup in ED revealed CT head-No acute intracranial abnormality. CTA head/neck-.  No large vessel occlusion seen in the intracranial circulation.  There is some stenosis of the right cavernous carotid artery and right middle cerebral artery at least 50%. High-grade stenosis of the right proximal internal carotid artery which is difficult to measure secondary calcification.  Stenosis at least 90%. 80% stenosis left proximal internal carotid artery. Patient was admitted to hospital medicine for further evaluation with neurology consultation. Neurology suggested suspicious for a right MCA distribution stroke. Agreed with ASA and Plavix for now. Recommended Vascular surgery consultation and await MRI and ECHO results. Preliminary results of CD right ICA 70-99% stenosis, left ICA 50-69% stenosis. ECHO indicated markedly reduced LV systolic function with EF of 30 to 35%, LV is

## 2025-04-01 NOTE — PROGRESS NOTES
Occupational Therapy     04/01/25 1600   Subjective   Subjective Pt in bed upon arrival for therapy. Pt agreeable to participate.   Pain Assessment   Pain Assessment None - Denies Pain   Cognition   Overall Cognitive Status Exceptions   Cognition Comment pleasant. flat affect. will answer questions and follow instuction with time and repetition.   Orientation   Overall Orientation Status X   Bed Mobility Training   Bed Mobility Training Yes   Overall Level of Assistance Minimal assistance   Interventions Verbal cues;Tactile cues;Manual cues   Supine to Sit Minimal assistance   Scooting Minimal assistance   Transfer Training   Transfer Training Yes   Overall Level of Assistance Minimal assistance  (Andrea Stedy)   Interventions Verbal cues;Tactile cues;Manual cues   Bed to Chair Minimal assistance  (Andrea Stedy)   ADL   Feeding Pureed diet;Supervision;Setup   Grooming Minimal assistance;Setup   UE Bathing Moderate assistance   LE Bathing Maximum assistance   UE Dressing Moderate assistance   LE Dressing Maximum assistance   Putting On/Taking Off Footwear Maximum assistance   Toileting Maximum assistance;Dependent/Total   Toileting Skilled Clinical Factors purewick   Functional Mobility Minimal assistance;Moderate assistance   Functional Mobility Skilled Clinical Factors Andrea Stedy   Assessment   Assessment Tx focused on bed mobility techniques, STS mobility using sherrell walker (but unable to takes steps safely), and transfer to recliner using andrea stedy. Pt left in recliner with all needs in reach with chair alarm donned.   Activity Tolerance Patient tolerated treatment well   Discharge Recommendations 24 hour supervision or assist;Patient would benefit from continued therapy after discharge   Occupational Therapy Plan   Times Per Week 3-5   Times Per Day Once a day   OT Plan of Care   Tuesday X     Electronically signed by WILLIE Simon on 4/1/2025 at 4:17 PM

## 2025-04-01 NOTE — CARE COORDINATION
Date / Time of Evaluation:   4/1/2025    2:10 PM  Assessment Completed by:   Aditya Armstrong, RN, BSN      Patient Name:   Sharmin Valera  MRN:   [id number]  YOB: 1960    Patient Admission Status:       Patient Contact Information:    [address] [facility] [id number] [phone] (home)   Telephone Information:   Mobile [phone]     Above information verified?    [x]   Yes  []   No    (Best Practice:   Have patient/caregiver verify above address and phone number by stating out loud their current address and reachable phone number.      Initial Assessment Completed at bedside with:      []   Patient  [x]   Family/Caregiver/Guardian   [x]   Other:  Medical Record    Current PCP:    No primary care provider on file.    PCP verified?      [x]   Yes - has MD at facility  []   No    Emergency Contacts:    Extended Emergency Contact Information  Primary Emergency Contact: CHARMAINE VALERA  Home Phone: [phone]  Relation: Grandchild  Preferred language: English  Secondary Emergency Contact: Chu Valera   [facility]  Home Phone: [phone]  Relation: Child    Advance Directives:    Does Ms. Valera have an advance directive in her electronic medical record?    []   Yes  [x]   No    Code Status:   Full Code    Do you have any of the following unmet social needs that would keep you from returning home safely:    []   Yes  [x]   No                      Financial:    Payor: [employer] MEDICARE / Plan: [employer] GOLD PLUS HMO / Product Type: *No Product type* /     Pre-Cert required for SNF:     [x]   Yes  []   No    Have Long Term Care Insurance:      []   Yes  [x]   No      Pharmacy:    [facility] [address] [place] [address] -  [phone] -  [phone]  [address] [place] [address]  Phone: [phone] Fax: [phone]    [facility] [place] [place] - [address] - P [phone] - F [phone]  [facility] [email or web address]] [address]  [facility] [address]  Phone: [phone]

## 2025-04-01 NOTE — PROGRESS NOTES
Patient:   Sharmin Petersen  MR#:    [id number]   Room:    [facility]/522-   YOB: 1960  Date of Progress Note: 4/1/2025  Time of Note                           7:16 AM  Consulting Physician:   Carson Waldron M.D.  Attending Physician:  Nabil Hill MD       CHIEF COMPLAINT: Left-sided weakness  Subjective:  This 65 y.o. female who presents with left-sided weakness.  ED note says that she came from a nursing home but the patient tells me she lives at home by herself.  Had 1 or 2 episodes of left-sided weakness and came in to the ED at [facility] initially and was sent back home and then returned to this ED on 3/30.  Reportedly on no antiplatelet drugs.  CTA of the head and neck read as showing at least 90% proximal right ICA stenosis and 80% on the left.  CT of the brain relatively unremarkable as were routine blood studies.   Plavix and aspirin were added.  Also on Lipitor.  Patient reportedly with a history of dementia.  No new complaints.  She feels that her left upper extremity weakness is unchanged.  REVIEW OF SYSTEMS:  Constitutional: No fevers No chills  Neck:No stiffness  Respiratory: No shortness of breath  Cardiovascular: No chest pain No palpitations  Gastrointestinal: No abdominal pain    Genitourinary: No Dysuria  Neurological: No headache, no confusion      PHYSICAL EXAM:  /78   Pulse 64   Temp 97.5 °F (36.4 °C) (Temporal)   Resp 16   Ht 1.626 m (5' 4\")   Wt 81.6 kg (180 lb)   LMP  (LMP Unknown)   SpO2 95%   BMI 30.90 kg/m²     Constitutional: she appears well-developed and well-nourished.   Eyes - conjunctiva normal.  Pupils react to light  Ear, nose, throat -hearing intact to voice. No scars, masses, or lesions over external nose or ears, no atrophy of tongue  Neck-symmetric, no masses noted, no jugular vein distension  Respiration- chest wall appears symmetric, good expansion,   normal effort without use of accessory muscles  Cardiovascular-  results discussed with RONAK Richardson on 03/31/2025 at 1829 hours.  2. Encephalomalacia and gliosis in the right frontal lobe.  3. Small vessel disease and age-related involution.  4. Nearly empty appearance of the sella.                 .        ______________________________________   Electronically signed by: NILDA GARCIA D.O.  Date:     03/31/2025    Carotid Dopplers:      Vascular lab preliminary.  Bilateral carotid duplex scan completed.  Right ICA 70-99% stenosis.  Left ICA  50-69% stenosis.  Bilateral vertebral arteries antegrade flow.  Final report pending.                   RECORD REVIEW: Previous medical records, medications were reviewed at today's visit    IMPRESSION:   Patient with high-grade right carotid stenosis and apparently showering emboli intermittently into the right hemisphere.  MRI films independently reviewed.  There is some involvement of the occipital lobe.  Her PCA may come off of the ICA on that side.  Now on aspirin and Plavix.  Awaiting vascular surgery input for timing of carotid endarterectomy.  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  Cardiology to opine on echo findings.  Continue statin.  Permissive hypertension.    More than 35 minutes were spent reviewing the patient's records, films, examination and and counseling and coordination of care.  More than 50% spent on the latter.

## 2025-04-01 NOTE — CONSULTS
[facility]  Cardiology Consult      Requesting MD:  Nabil Hill MD   Admit Status:         History obtained from:   [x] Patient  [] Other (specify):     Patient:  Sharmin Petersen  [id number]     Chief Complaint:   Chief Complaint   Patient presents with    Extremity Weakness     Left side weakness LKW > 24 hours, seen at other facility and discharged today, sent back by nursing home d/t continued symptoms     Facial Droop         HPI: Ms. Petersen is a 65 y.o. female with a history of dementia with unclear living situation (whether nursing home resident or home), COPD, prior CVA, diabetes, hypertension who presents to the hospital after which she tells me is feeling \"not herself\".  Per chart review, apparently she complained about episodic left-sided weakness which brought her to the hospital.    Per the patient, she lives alone at home.  She  is a poor historian but suggest that she lives independently and able to do all basic household chores.  She denies any episodes of chest pain or chest pressure while performing basic activities of daily living.    It is unclear to me how compliant she is with her home medications.  Per discussion with RN, family is family is peripherally involved in her care.     Per chart review, she had a CTA head and neck that showed severe right ICA and left ICA stenosis.  Her MRI from 3/31/2025 shows multiple right-sided acute CVAs.    As part of her workup, she also underwent an echocardiogram on 3/31/2025 that demonstrated an LVEF of 30 to 35%, mild RV dysfunction with no critical valvular pathology.    Review of Systems:  Review of Systems    Cardiac Specific Data:  Specialty Problems          Cardiology Problems    * (Principal) Stroke determined by clinical assessment ([facility])           Past Medical History:  Past Medical History:   Diagnosis Date    COPD (chronic obstructive pulmonary disease) ([facility])     CVA (cerebral vascular accident) ([facility])     Dementia

## 2025-04-02 ENCOUNTER — APPOINTMENT (OUTPATIENT)
Dept: NUCLEAR MEDICINE | Age: 65
End: 2025-04-02
Attending: INTERNAL MEDICINE
Payer: MEDICARE

## 2025-04-02 DIAGNOSIS — I63.9 STROKE DETERMINED BY CLINICAL ASSESSMENT (HCC): Primary | ICD-10-CM

## 2025-04-02 DIAGNOSIS — R94.39 ABNORMAL RESULT OF OTHER CARDIOVASCULAR FUNCTION STUDY: ICD-10-CM

## 2025-04-02 DIAGNOSIS — R94.39 ABNORMAL CARDIOVASCULAR STRESS TEST: ICD-10-CM

## 2025-04-02 PROBLEM — I65.22 ASYMPTOMATIC STENOSIS OF LEFT CAROTID ARTERY: Status: ACTIVE | Noted: 2025-04-02

## 2025-04-02 PROBLEM — I65.21 SYMPTOMATIC STENOSIS OF RIGHT CAROTID ARTERY: Status: ACTIVE | Noted: 2025-04-02

## 2025-04-02 LAB
ANION GAP SERPL CALCULATED.3IONS-SCNC: 10 MMOL/L (ref 8–16)
BUN SERPL-MCNC: 15 MG/DL (ref 8–23)
CALCIUM SERPL-MCNC: 8.6 MG/DL (ref 8.8–10.2)
CHLORIDE SERPL-SCNC: 105 MMOL/L (ref 98–107)
CO2 SERPL-SCNC: 24 MMOL/L (ref 22–29)
CREAT SERPL-MCNC: 0.9 MG/DL (ref 0.5–0.9)
ERYTHROCYTE [DISTWIDTH] IN BLOOD BY AUTOMATED COUNT: 12.6 % (ref 11.5–14.5)
GLUCOSE SERPL-MCNC: 146 MG/DL (ref 70–99)
HCT VFR BLD AUTO: 32.3 % (ref 37–47)
HGB BLD-MCNC: 10.6 G/DL (ref 12–16)
MCH RBC QN AUTO: 30.9 PG (ref 27–31)
MCHC RBC AUTO-ENTMCNC: 32.8 G/DL (ref 33–37)
MCV RBC AUTO: 94.2 FL (ref 81–99)
NUC STRESS EJECTION FRACTION: 27 %
PLATELET # BLD AUTO: 230 K/UL (ref 130–400)
PMV BLD AUTO: 11.2 FL (ref 9.4–12.3)
POTASSIUM SERPL-SCNC: 3.8 MMOL/L (ref 3.5–5)
RBC # BLD AUTO: 3.43 M/UL (ref 4.2–5.4)
SODIUM SERPL-SCNC: 139 MMOL/L (ref 136–145)
STRESS BASELINE DIAS BP: 68 MMHG
STRESS BASELINE HR: 50 BPM
STRESS BASELINE SYS BP: 191 MMHG
STRESS EXERCISE DUR MIN: 5 MIN
STRESS EXERCISE DUR SEC: 0 SEC
STRESS PEAK DIAS BP: 70 MMHG
STRESS PEAK SYS BP: 144 MMHG
STRESS PERCENT HR ACHIEVED: 47 %
STRESS POST PEAK HR: 73 BPM
STRESS RATE PRESSURE PRODUCT: NORMAL BPM*MMHG
STRESS STAGE 1 BP: NORMAL MMHG
STRESS STAGE 1 HR: 57 BPM
STRESS STAGE 2 BP: NORMAL MMHG
STRESS STAGE 2 HR: 73 BPM
STRESS STAGE 3 BP: NORMAL MMHG
STRESS STAGE 3 HR: 72 BPM
STRESS STAGE 4 BP: NORMAL MMHG
STRESS STAGE 4 HR: 73 BPM
STRESS STAGE 5 BP: NORMAL MMHG
STRESS STAGE 5 HR: 73 BPM
STRESS STAGE RECOVERY 1 BP: NORMAL MMHG
STRESS STAGE RECOVERY 1 HR: 70 BPM
STRESS TARGET HR: 154 BPM
WBC # BLD AUTO: 8.1 K/UL (ref 4.8–10.8)

## 2025-04-02 PROCEDURE — 93016 CV STRESS TEST SUPVJ ONLY: CPT | Performed by: INTERNAL MEDICINE

## 2025-04-02 PROCEDURE — 78452 HT MUSCLE IMAGE SPECT MULT: CPT | Performed by: INTERNAL MEDICINE

## 2025-04-02 PROCEDURE — 78452 HT MUSCLE IMAGE SPECT MULT: CPT

## 2025-04-02 PROCEDURE — 3430000000 HC RX DIAGNOSTIC RADIOPHARMACEUTICAL: Performed by: INTERNAL MEDICINE

## 2025-04-02 PROCEDURE — 99232 SBSQ HOSP IP/OBS MODERATE 35: CPT | Performed by: INTERNAL MEDICINE

## 2025-04-02 PROCEDURE — 94760 N-INVAS EAR/PLS OXIMETRY 1: CPT

## 2025-04-02 PROCEDURE — 97530 THERAPEUTIC ACTIVITIES: CPT

## 2025-04-02 PROCEDURE — 6370000000 HC RX 637 (ALT 250 FOR IP): Performed by: INTERNAL MEDICINE

## 2025-04-02 PROCEDURE — 93018 CV STRESS TEST I&R ONLY: CPT | Performed by: INTERNAL MEDICINE

## 2025-04-02 PROCEDURE — 6370000000 HC RX 637 (ALT 250 FOR IP): Performed by: NURSE PRACTITIONER

## 2025-04-02 PROCEDURE — 1200000000 HC SEMI PRIVATE

## 2025-04-02 PROCEDURE — 6360000002 HC RX W HCPCS: Performed by: NURSE PRACTITIONER

## 2025-04-02 PROCEDURE — A9502 TC99M TETROFOSMIN: HCPCS | Performed by: INTERNAL MEDICINE

## 2025-04-02 PROCEDURE — 6360000002 HC RX W HCPCS: Performed by: INTERNAL MEDICINE

## 2025-04-02 PROCEDURE — 80048 BASIC METABOLIC PNL TOTAL CA: CPT

## 2025-04-02 PROCEDURE — 85027 COMPLETE CBC AUTOMATED: CPT

## 2025-04-02 PROCEDURE — 6370000000 HC RX 637 (ALT 250 FOR IP): Performed by: PSYCHIATRY & NEUROLOGY

## 2025-04-02 PROCEDURE — 36415 COLL VENOUS BLD VENIPUNCTURE: CPT

## 2025-04-02 PROCEDURE — 99232 SBSQ HOSP IP/OBS MODERATE 35: CPT | Performed by: PSYCHIATRY & NEUROLOGY

## 2025-04-02 RX ORDER — TRAMADOL HYDROCHLORIDE 50 MG/1
50 TABLET ORAL EVERY 8 HOURS
Status: DISCONTINUED | OUTPATIENT
Start: 2025-04-02 | End: 2025-04-02

## 2025-04-02 RX ORDER — ASPIRIN 325 MG
325 TABLET ORAL ONCE
Status: CANCELLED | OUTPATIENT
Start: 2025-04-03

## 2025-04-02 RX ORDER — SODIUM CHLORIDE 9 MG/ML
INJECTION, SOLUTION INTRAVENOUS CONTINUOUS
Status: CANCELLED | OUTPATIENT
Start: 2025-04-03

## 2025-04-02 RX ORDER — REGADENOSON 0.08 MG/ML
0.4 INJECTION, SOLUTION INTRAVENOUS
Status: COMPLETED | OUTPATIENT
Start: 2025-04-02 | End: 2025-04-02

## 2025-04-02 RX ORDER — SPIRONOLACTONE 25 MG/1
25 TABLET ORAL DAILY
Status: DISCONTINUED | OUTPATIENT
Start: 2025-04-02 | End: 2025-04-04 | Stop reason: HOSPADM

## 2025-04-02 RX ORDER — NITROGLYCERIN 0.4 MG/1
0.4 TABLET SUBLINGUAL EVERY 5 MIN PRN
Status: CANCELLED | OUTPATIENT
Start: 2025-04-03

## 2025-04-02 RX ADMIN — ENOXAPARIN SODIUM 40 MG: 100 INJECTION SUBCUTANEOUS at 19:54

## 2025-04-02 RX ADMIN — ATORVASTATIN CALCIUM 80 MG: 80 TABLET, FILM COATED ORAL at 19:54

## 2025-04-02 RX ADMIN — REGADENOSON 0.4 MG: 0.08 INJECTION, SOLUTION INTRAVENOUS at 13:29

## 2025-04-02 RX ADMIN — CLOPIDOGREL BISULFATE 75 MG: 75 TABLET, FILM COATED ORAL at 09:21

## 2025-04-02 RX ADMIN — TETROFOSMIN 8 MILLICURIE: 0.23 INJECTION, POWDER, LYOPHILIZED, FOR SOLUTION INTRAVENOUS at 14:00

## 2025-04-02 RX ADMIN — ASPIRIN 81 MG: 81 TABLET, CHEWABLE ORAL at 09:21

## 2025-04-02 RX ADMIN — METOPROLOL SUCCINATE 25 MG: 25 TABLET, EXTENDED RELEASE ORAL at 09:21

## 2025-04-02 RX ADMIN — SPIRONOLACTONE 25 MG: 25 TABLET ORAL at 15:34

## 2025-04-02 RX ADMIN — TETROFOSMIN 24 MILLICURIE: 0.23 INJECTION, POWDER, LYOPHILIZED, FOR SOLUTION INTRAVENOUS at 13:59

## 2025-04-02 NOTE — CONSULTS
Vascular Surgery Consultation    I was consulted to see the patient for carotid stenosis.    HPI    Sharmin Petersen is a 65-year-old  woman who developed sudden left upper and lower extremity weakness and was admitted to the hospital after she was found to have a cerebrovascular accident by clinical symptomatology.  Prior to this episode, she denies any symptoms of amaurosis fugax, transient ischemic attack or cerebrovascular accident.  She never knew she had any carotid stenosis.    She had a CTA of the neck and brain which showed right internal carotid artery 90% stenosis with fairly heavy calcium burden.  She had an MRI of the brain which showed right hemispheric cerebrovascular accident.  There is no evidence for bleed.    Since she has been hospitalized, she is improving as far as her strength and coordination goes.    Current Medical History    Sharmin Petersen is a 65 y.o. female with the following [facility]:  Patient Active Problem List    Diagnosis Date Noted    Acute left-sided weakness 04/01/2025     Priority: High    Acute systolic congestive heart failure ([facility]) 04/01/2025     Priority: High    Stroke determined by clinical assessment ([facility]) 03/30/2025     Current Facility-Administered Medications   Medication Dose Route Frequency Provider Last Rate Last Admin    metoprolol succinate (TOPROL XL) extended release tablet 25 mg  25 mg Oral Daily Janiya Lyons MD   25 mg at 04/01/25 1455    lisinopril (PRINIVIL;ZESTRIL) tablet 2.5 mg  2.5 mg Oral Daily Janiya Lyons MD   2.5 mg at 04/01/25 1455    clopidogrel (PLAVIX) tablet 75 mg  75 mg Oral Daily Carson Waldron MD   75 mg at 04/01/25 0744    sodium chloride flush 0.9 % injection 5-40 mL  5-40 mL IntraVENous 2 times per day Osvaldo Bruno APRN - CNP   10 mL at 04/01/25 2229    sodium chloride flush 0.9 % injection 5-40 mL  5-40 mL IntraVENous PRN Osvaldo Bruno APRN - CNP        0.9 % sodium chloride infusion

## 2025-04-02 NOTE — CARE COORDINATION
Patient/family have accepted bed offer from [facility]. Patient can discharge 04/03 if medically stable.  [facility]   P   F ()   F (Ref to Elizabeth Bull)  Electronically signed by Aditya Armstrong RN, BSN on 4/2/2025 at 2:34 PM

## 2025-04-02 NOTE — PROGRESS NOTES
[facility]      Progress Note    Patient:  Sharmin Petersen  YOB: 1960  Date of Service: 4/2/2025  MRN: [id number]   Acct: [id number]   Primary Care Physician: No primary care provider on file.  Advance Directive: Full Code  Admit Date: 3/30/2025       Hospital Day: 3    Portions of this note have been copied forward, however, updated to reflect the most current clinical status of this patient.     CHIEF COMPLAINT Stroke like symptoms    SUBJECTIVE:  Ms. Petersen was resting in chair this morning. Reports LUE and LLE weakness is improving. A&O to self  and place, disoriented to time. Denies SOB or chest pain.        CUMULATIVE HOSPITAL COURSE:   The patient is a 65 y.o. female with PMH of COPD, stroke, Dementia, hypertension and hyperlipidemia who presented to [facility] ED for evaluation of strokelike symptoms.  Reportedly patient was noted to have left-sided weakness day prior to admission at nursing home. Reportedly was seen at OSH ED earlier and had improvement initially. Was noted to have return of left arm weakness and brought to [facility] ED for further evaluation. Workup in ED revealed CT head-No acute intracranial abnormality. CTA head/neck-.  No large vessel occlusion seen in the intracranial circulation.  There is some stenosis of the right cavernous carotid artery and right middle cerebral artery at least 50%. High-grade stenosis of the right proximal internal carotid artery which is difficult to measure secondary calcification.  Stenosis at least 90%. 80% stenosis left proximal internal carotid artery. Patient was admitted to hospital medicine for further evaluation with neurology consultation. Neurology suggested suspicious for a right MCA distribution stroke. Agreed with ASA and Plavix for now. Recommended Vascular surgery consultation and await MRI and ECHO results. Preliminary results of CD right ICA 70-99% stenosis, left ICA 50-69% stenosis. ECHO indicated markedly reduced LV systolic function  normal RV size with mildly reduced systolic function, mild MR, negative bubble study   - NIHSS/Neuro checks   - Nursing swallow assessment   - PT/OT/SLP   - FLP reviewed    - HgbA1c 6.0   - Allow permissive hypertension    - PRN labetalol with strict parameters   - Monitor on telemetry        New onset of LV Dysfunction-    - Cardiology consulted   - Suggested Lexiscan and GDMT    - Lexiscan completed, results pending      Hypertension-    - Allow permissive hypertension    - PRN labetalol with strict parameters        DVT Prophylaxis: Lovenox      Discharge planning: Case management assisting with DC planning to SNF, can DC on 4/3 if stable.       Further Orders per Clinical course/attending.     Electronically signed by MINH Guadarrama CNP on 4/2/2025 at 2:51 PM       EM[facility] Dragon/Transcription disclaimer:   Much of this encounter note is an electronic transcription/translation of spoken language to printed text. The electronic translation of spoken language may permit erroneous, or at times, nonsensical words or phrases to be inadvertently transcribed; although attempts have made to review the note for such errors, some may still exist.

## 2025-04-02 NOTE — PROGRESS NOTES
Patient:   Sharmin Petersen  MR#:    [id number]   Room:    [facility]/522-   YOB: 1960  Date of Progress Note: 4/2/2025  Time of Note                           8:53 AM  Consulting Physician:   Carson Waldron M.D.  Attending Physician:  Nabil Hill MD       CHIEF COMPLAINT: Left-sided weakness  Subjective:  This 65 y.o. female who presents with left-sided weakness.  ED note says that she came from a nursing home but the patient tells me she lives at home by herself.  Had 1 or 2 episodes of left-sided weakness and came in to the ED at [facility] initially and was sent back home and then returned to this ED on 3/30.  Reportedly on no antiplatelet drugs.  CTA of the head and neck read as showing at least 90% proximal right ICA stenosis and 80% on the left.  CT of the brain relatively unremarkable as were routine blood studies.   Plavix and aspirin were added.  Also on Lipitor.  Patient reportedly with a history of dementia.  No complaints this morning  REVIEW OF SYSTEMS:  Constitutional: No fevers No chills  Neck:No stiffness  Respiratory: No shortness of breath  Cardiovascular: No chest pain No palpitations  Gastrointestinal: No abdominal pain    Genitourinary: No Dysuria  Neurological: No headache, no seizures      PHYSICAL EXAM:  /60   Pulse 54   Temp 97.2 °F (36.2 °C) (Temporal)   Resp 16   Ht 1.626 m (5' 4\")   Wt 81.6 kg (180 lb)   LMP  (LMP Unknown)   SpO2 100%   BMI 30.90 kg/m²     Constitutional: she appears well-developed and well-nourished.   Eyes - conjunctiva normal.  Pupils react to light  Ear, nose, throat -hearing intact to voice. No scars, masses, or lesions over external nose or ears, no atrophy of tongue  Neck-symmetric, no masses noted, no jugular vein distension  Respiration- chest wall appears symmetric, good expansion,   normal effort without use of accessory muscles  Cardiovascular- RRR  Musculoskeletal - no significant wasting of muscles noted, no bony  gliosis in the right frontal lobe.  3. Small vessel disease and age-related involution.  4. Nearly empty appearance of the sella.                 .        ______________________________________   Electronically signed by: NILDA GARCIA D.O.  Date:     03/31/2025    Carotid Dopplers:      Vascular lab preliminary.  Bilateral carotid duplex scan completed.  Right ICA 70-99% stenosis.  Left ICA  50-69% stenosis.  Bilateral vertebral arteries antegrade flow.  Final report pending.                   RECORD REVIEW: Previous medical records, medications were reviewed at today's visit    IMPRESSION:   Patient with high-grade right carotid stenosis and apparently showering emboli intermittently into the right hemisphere.    Now on aspirin and Plavix. .  Continue statin.  Permissive hypertension.  Awaiting date for right carotid endarterectomy.  Continue PT/OT/SLP.

## 2025-04-02 NOTE — PROGRESS NOTES
Occupational Therapy     04/02/25 1400   Subjective   Subjective Pt in bed upon arrival for therapy. Pt agreeable to participate.   Pain Assessment   Pain Assessment None - Denies Pain   Cognition   Overall Cognitive Status Exceptions   Cognition Comment pleasant. flat affect. will answer questions and follow instuction with time and repetition.   Orientation   Overall Orientation Status X   Bed Mobility Training   Bed Mobility Training Yes   Overall Level of Assistance Minimal assistance   Interventions Verbal cues;Tactile cues;Manual cues   Supine to Sit Minimal assistance   Sit to Supine Minimal assistance   Scooting Minimal assistance   Transfer Training   Transfer Training Yes   Overall Level of Assistance Minimal assistance;Partial/Moderate assistance;2 Person assistance  (for safety and balance)   Interventions Verbal cues;Tactile cues;Manual cues   Sit to Stand Minimal assistance;Partial/Moderate assistance;2 Person assistance   Stand to Sit Minimal assistance;Partial/Moderate assistance;2 Person assistance   Balance   Sitting Intact   Standing Impaired   Standing - Static Constant support   Standing - Dynamic Constant support   ADL   Feeding Pureed diet;Supervision;Setup   Grooming Minimal assistance;Setup   UE Bathing Moderate assistance   LE Bathing Maximum assistance   UE Dressing Moderate assistance   LE Dressing Maximum assistance   Putting On/Taking Off Footwear Maximum assistance   Toileting Maximum assistance;Dependent/Total   Toileting Skilled Clinical Factors purewick   Functional Mobility Minimal assistance;Moderate assistance   Functional Mobility Skilled Clinical Factors Sofiya Bloom   Assessment   Assessment Tx focused on sitting EOB to perform h/g tasks. Pt instructed on STS mobility, standing balance activities, steps to foot and head of bed at RW level with Min-Mod assist x2 for balance. Pt returned to bed after tasks and set up for lunch with HOB raised.   Activity Tolerance Patient  tolerated treatment well   Discharge Recommendations 24 hour supervision or assist;Patient would benefit from continued therapy after discharge   Occupational Therapy Plan   Times Per Week 3-5   Times Per Day Once a day   OT Plan of Care   Wednesday X     Electronically signed by WILLIE Simon on 4/2/2025 at 2:37 PM

## 2025-04-02 NOTE — PROGRESS NOTES
Physical Therapy  Name: Sharmin Petersen  MRN:  [id number]  Date of service:  4/2/2025 04/02/25 1200   Subjective   Subjective Pt agreed to therapy.   Pain Assessment   Pain Assessment None - Denies Pain   Bed Mobility   Supine to Sit Contact guard assistance   Sit to Supine Contact guard assistance   Transfers   Sit to Stand Minimal Assistance;2 Person Assistance   Stand to Sit Minimal Assistance;2 Person Assistance   Comment stood 2x with RW; posterior lean initially but able to correct   Ambulation   Surface Level tile   Device Rolling Walker   Assistance Minimal assistance;2 Person assistance   Quality of Gait unsteady, cues and assist to weight shift   Gait Deviations Decreased step length;Decreased step height   Distance 4' x 2 side stepping   Comments more difficulty stepping towards L side   Short Term Goals   Time Frame for Short Term Goals 2 WKS   Short Term Goal 1 SUP<>SIT, MIN A   Short Term Goal 2 SIT<>STAND, MIN A   Short Term Goal 3 TF'S WITH AD AS INDICATED, MIN A   Short Term Goal 4 AMB 25 FT WITH AD AS INDICATED, MIN A   Conditions Requiring Skilled Therapeutic Intervention   Body Structures, Functions, Activity Limitations Requiring Skilled Therapeutic Intervention Decreased functional mobility ;Decreased cognition;Decreased safe awareness;Decreased strength;Decreased balance;Decreased fine motor control   Assessment Pt improving with mobility and able to practice taking side steps with  RW. Did need intermittent assist with LUE holding onto RW. Pt needs assist for balance and weight shifting. Assisted back to bed with all needs in reach.   Activity Tolerance   Activity Tolerance Patient tolerated treatment well   PT Plan of Care   Wednesday X   Safety Devices   Type of Devices Bed alarm in place;Call light within reach;Left in bed           Electronically signed by Shilpa Fine PTA on 4/2/2025 at 1:02 PM

## 2025-04-02 NOTE — PLAN OF CARE
Problem: Chronic Conditions and Co-morbidities  Goal: Patient's chronic conditions and co-morbidity symptoms are monitored and maintained or improved  Outcome: Progressing  Flowsheets (Taken 4/2/2025 0012)  Care Plan - Patient's Chronic Conditions and Co-Morbidity Symptoms are Monitored and Maintained or Improved:   Monitor and assess patient's chronic conditions and comorbid symptoms for stability, deterioration, or improvement   Collaborate with multidisciplinary team to address chronic and comorbid conditions and prevent exacerbation or deterioration   Update acute care plan with appropriate goals if chronic or comorbid symptoms are exacerbated and prevent overall improvement and discharge     Problem: Discharge Planning  Goal: Discharge to home or other facility with appropriate resources  Outcome: Progressing  Flowsheets (Taken 4/2/2025 0012)  Discharge to home or other facility with appropriate resources:   Identify barriers to discharge with patient and caregiver   Arrange for needed discharge resources and transportation as appropriate   Identify discharge learning needs (meds, wound care, etc)   Refer to discharge planning if patient needs post-hospital services based on physician order or complex needs related to functional status, cognitive ability or social support system     Problem: Safety - Adult  Goal: Free from fall injury  Outcome: Progressing     Problem: ABCDS Injury Assessment  Goal: Absence of physical injury  Outcome: Progressing     Problem: Confusion  Goal: Confusion, delirium, dementia, or psychosis is improved or at baseline  Description: INTERVENTIONS:  1. Assess for possible contributors to thought disturbance, including medications, impaired vision or hearing, underlying metabolic abnormalities, dehydration, psychiatric diagnoses, and notify attending LIP  2. B[place]l high risk fall precautions, as indicated  3. Provide frequent short contacts to provide reality reorientation,

## 2025-04-02 NOTE — PROGRESS NOTES
Cardiology Daily Note Janiya Lyons MD      Patient:  Sharmin Petersen  [id number]    Patient Active Problem List    Diagnosis Date Noted    Acute left-sided weakness 04/01/2025    Acute systolic congestive heart failure ([facility]) 04/01/2025    Symptomatic stenosis of right carotid artery 04/02/2025    Asymptomatic stenosis of left carotid artery 04/02/2025    Stroke determined by clinical assessment ([facility]) 03/30/2025       Admit Date:  3/30/2025    Admission Problem List: Present on Admission:   Stroke determined by clinical assessment ([facility])   Acute left-sided weakness   Acute systolic congestive heart failure ([facility])   Symptomatic stenosis of right carotid artery   Asymptomatic stenosis of left carotid artery      Cardiac Specific Data:  Specialty Problems          Cardiology Problems    Acute systolic congestive heart failure ([facility])        * (Principal) Stroke determined by clinical assessment ([facility])        Asymptomatic stenosis of left carotid artery        Symptomatic stenosis of right carotid artery           Subjective:  Ms. Petersen is resting comfortably at bedside.  She remains somewhat confused.  She is breathing comfortably in supine position.  Given her confusion/dementia, much of the interview was conducted with the RN.  RN stated that currently the granddaughter, Tiffanie, is making most of the patient's medical decisions although there is no clear power of  noted.    Objective:   BP (!) 145/68   Pulse 62   Temp 97.8 °F (36.6 °C) (Temporal)   Resp 18   Ht 1.626 m (5' 4\")   Wt 81.6 kg (180 lb)   LMP  (LMP Unknown)   SpO2 99%   BMI 30.90 kg/m²           Assessment:  Acute right-sided CVA  Minimally elevated troponin  Severe right ICA stenosis and moderate to severe left ICA stenosis  Newly diagnosed LV dysfunction without heart failure exacerbation  COPD  Diabetes  Dementia  Questionable medication compliance    Plan:  For heart failure, will start spironolactone  Her nuclear stress test shows higher  the performed exam and include at least one of the following: Automated exposure control, adjustment of the mA and/or kV according to size, and the use of iterative reconstruction technique.  ______________________________________ Electronically signed by: NILDA GONZALEZ M.D. Date:     03/30/2025 Time:    19:48     XR CHEST PORTABLE  Result Date: 3/30/2025  EXAM: CHEST, ONE-VIEW  HISTORY:  Code stroke  FINDINGS: Cardiac and mediastinal contours are normal.  Pulmonary vasculature is normal.  Lungs are clear.  Atherosclerotic calcification of the aorta.  Bony thorax is unremarkable.      No acute cardiopulmonary disease    ______________________________________ Electronically signed by: NILDA GONZALEZ M.D. Date:     03/30/2025 Time:    19:27     CT HEAD WO CONTRAST  Result Date: 3/30/2025    EXAM: CT HEAD WITHOUT CONTRAST  HISTORY: Stroke symptoms  COMPARISON:  08/31/2024  TECHNIQUE:  Serial axial images of the brain were obtained from the skull base to the vertex without IV contrast.  FINDINGS:  No acute intracranial hemorrhage.  No hydrocephalus.  No midline shift.  Parenchymal volume loss.  Chronic microangiopathy.  The calvarium is intact.  The intracranial appearance is grossly stable.       No acute intracranial abnormality.  If symptoms persist, consider follow-up MRI brain.  All CT scans are performed using dose optimization techniques as appropriate to the performed exam and include at least one of the following: Automated exposure control, adjustment of the mA and/or kV according to size, and the use of iterative reconstruction technique.  ______________________________________ Electronically signed by: CHARLY PARKER D.O. Date:     03/30/2025 Time:    19:15     CT Head Without Contrast  Result Date: 3/30/2025  EXAMINATION: CT HEAD WO CONTRAST- 3/30/2025 12:38 PM HISTORY: Weakness. DOSE: 639 mGy-cm (Automatic exposure control technique was implemented in an effort to keep the radiation dose as low

## 2025-04-02 NOTE — CARE COORDINATION
04/02/25 0931   IMM Letter   IMM Letter given to Patient/Family/Significant other/Guardian/POA/by: yusef garcia sw   IMM Letter date given: 04/02/25   IMM Letter time given: 0915     Second IMM given to patient and explained with patient verbalizing understanding.  All questions and concerns addressed     Signed letter placed in pt soft chart   Patient declined waiting 4 hr period prior to discharge.   Electronically signed by Yusef Garcia on 4/2/2025 at 9:31 AM

## 2025-04-03 ENCOUNTER — TELEPHONE (OUTPATIENT)
Dept: CARDIOLOGY | Age: 65
End: 2025-04-03

## 2025-04-03 LAB — ECHO BSA: 1.92 M2

## 2025-04-03 PROCEDURE — 4A023N7 MEASUREMENT OF CARDIAC SAMPLING AND PRESSURE, LEFT HEART, PERCUTANEOUS APPROACH: ICD-10-PCS | Performed by: INTERNAL MEDICINE

## 2025-04-03 PROCEDURE — 93458 L HRT ARTERY/VENTRICLE ANGIO: CPT | Performed by: INTERNAL MEDICINE

## 2025-04-03 PROCEDURE — B2111ZZ FLUOROSCOPY OF MULTIPLE CORONARY ARTERIES USING LOW OSMOLAR CONTRAST: ICD-10-PCS | Performed by: INTERNAL MEDICINE

## 2025-04-03 PROCEDURE — 6370000000 HC RX 637 (ALT 250 FOR IP): Performed by: INTERNAL MEDICINE

## 2025-04-03 PROCEDURE — 99232 SBSQ HOSP IP/OBS MODERATE 35: CPT | Performed by: PSYCHIATRY & NEUROLOGY

## 2025-04-03 PROCEDURE — 75716 ARTERY X-RAYS ARMS/LEGS: CPT | Performed by: INTERNAL MEDICINE

## 2025-04-03 PROCEDURE — 6360000002 HC RX W HCPCS: Performed by: NURSE PRACTITIONER

## 2025-04-03 PROCEDURE — 99152 MOD SED SAME PHYS/QHP 5/>YRS: CPT | Performed by: INTERNAL MEDICINE

## 2025-04-03 PROCEDURE — C1769 GUIDE WIRE: HCPCS | Performed by: INTERNAL MEDICINE

## 2025-04-03 PROCEDURE — 6360000002 HC RX W HCPCS: Performed by: INTERNAL MEDICINE

## 2025-04-03 PROCEDURE — 2709999900 HC NON-CHARGEABLE SUPPLY: Performed by: INTERNAL MEDICINE

## 2025-04-03 PROCEDURE — 99153 MOD SED SAME PHYS/QHP EA: CPT | Performed by: INTERNAL MEDICINE

## 2025-04-03 PROCEDURE — 6360000004 HC RX CONTRAST MEDICATION: Performed by: INTERNAL MEDICINE

## 2025-04-03 PROCEDURE — C1894 INTRO/SHEATH, NON-LASER: HCPCS | Performed by: INTERNAL MEDICINE

## 2025-04-03 PROCEDURE — B3101ZZ FLUOROSCOPY OF THORACIC AORTA USING LOW OSMOLAR CONTRAST: ICD-10-PCS | Performed by: INTERNAL MEDICINE

## 2025-04-03 PROCEDURE — 2500000003 HC RX 250 WO HCPCS: Performed by: INTERNAL MEDICINE

## 2025-04-03 PROCEDURE — 1200000000 HC SEMI PRIVATE

## 2025-04-03 PROCEDURE — 6370000000 HC RX 637 (ALT 250 FOR IP): Performed by: NURSE PRACTITIONER

## 2025-04-03 PROCEDURE — 94760 N-INVAS EAR/PLS OXIMETRY 1: CPT

## 2025-04-03 PROCEDURE — 2580000003 HC RX 258: Performed by: INTERNAL MEDICINE

## 2025-04-03 PROCEDURE — 97530 THERAPEUTIC ACTIVITIES: CPT

## 2025-04-03 RX ORDER — ATROPINE SULFATE 0.1 MG/ML
0.5 INJECTION INTRAVENOUS EVERY 5 MIN PRN
Status: ACTIVE | OUTPATIENT
Start: 2025-04-03 | End: 2025-04-03

## 2025-04-03 RX ORDER — ALBUTEROL SULFATE 90 UG/1
2 INHALANT RESPIRATORY (INHALATION) PRN
Status: ACTIVE | OUTPATIENT
Start: 2025-04-03 | End: 2025-04-03

## 2025-04-03 RX ORDER — ACETAMINOPHEN 325 MG/1
650 TABLET ORAL EVERY 4 HOURS PRN
Status: DISCONTINUED | OUTPATIENT
Start: 2025-04-03 | End: 2025-04-04 | Stop reason: HOSPADM

## 2025-04-03 RX ORDER — SODIUM CHLORIDE 0.9 % (FLUSH) 0.9 %
5-40 SYRINGE (ML) INJECTION PRN
Status: ACTIVE | OUTPATIENT
Start: 2025-04-03 | End: 2025-04-03

## 2025-04-03 RX ORDER — NITROGLYCERIN 0.4 MG/1
0.4 TABLET SUBLINGUAL EVERY 5 MIN PRN
Status: ACTIVE | OUTPATIENT
Start: 2025-04-03 | End: 2025-04-03

## 2025-04-03 RX ORDER — SODIUM CHLORIDE 9 MG/ML
INJECTION, SOLUTION INTRAVENOUS CONTINUOUS
Status: DISCONTINUED | OUTPATIENT
Start: 2025-04-03 | End: 2025-04-03

## 2025-04-03 RX ORDER — IOPAMIDOL 612 MG/ML
INJECTION, SOLUTION INTRAVASCULAR PRN
Status: DISCONTINUED | OUTPATIENT
Start: 2025-04-03 | End: 2025-04-03 | Stop reason: HOSPADM

## 2025-04-03 RX ORDER — METOPROLOL TARTRATE 1 MG/ML
5 INJECTION, SOLUTION INTRAVENOUS EVERY 5 MIN PRN
Status: DISCONTINUED | OUTPATIENT
Start: 2025-04-03 | End: 2025-04-03

## 2025-04-03 RX ORDER — SPIRONOLACTONE 25 MG/1
25 TABLET ORAL DAILY
Status: DISCONTINUED | OUTPATIENT
Start: 2025-04-03 | End: 2025-04-03 | Stop reason: SDUPTHER

## 2025-04-03 RX ORDER — MIDAZOLAM HYDROCHLORIDE 1 MG/ML
INJECTION, SOLUTION INTRAMUSCULAR; INTRAVENOUS PRN
Status: DISCONTINUED | OUTPATIENT
Start: 2025-04-03 | End: 2025-04-03 | Stop reason: HOSPADM

## 2025-04-03 RX ORDER — AMINOPHYLLINE 25 MG/ML
50 INJECTION, SOLUTION INTRAVENOUS PRN
Status: ACTIVE | OUTPATIENT
Start: 2025-04-03 | End: 2025-04-03

## 2025-04-03 RX ORDER — SODIUM CHLORIDE 9 MG/ML
500 INJECTION, SOLUTION INTRAVENOUS CONTINUOUS PRN
Status: ACTIVE | OUTPATIENT
Start: 2025-04-03 | End: 2025-04-03

## 2025-04-03 RX ORDER — METOPROLOL TARTRATE 25 MG/1
12.5 TABLET, FILM COATED ORAL 2 TIMES DAILY
Status: DISCONTINUED | OUTPATIENT
Start: 2025-04-03 | End: 2025-04-04 | Stop reason: HOSPADM

## 2025-04-03 RX ORDER — HEPARIN SODIUM 1000 [USP'U]/ML
INJECTION, SOLUTION INTRAVENOUS; SUBCUTANEOUS PRN
Status: DISCONTINUED | OUTPATIENT
Start: 2025-04-03 | End: 2025-04-03 | Stop reason: HOSPADM

## 2025-04-03 RX ORDER — NITROGLYCERIN 20 MG/100ML
INJECTION INTRAVENOUS PRN
Status: DISCONTINUED | OUTPATIENT
Start: 2025-04-03 | End: 2025-04-03 | Stop reason: HOSPADM

## 2025-04-03 RX ORDER — FENTANYL CITRATE 50 UG/ML
INJECTION, SOLUTION INTRAMUSCULAR; INTRAVENOUS PRN
Status: DISCONTINUED | OUTPATIENT
Start: 2025-04-03 | End: 2025-04-03 | Stop reason: HOSPADM

## 2025-04-03 RX ORDER — VERAPAMIL HYDROCHLORIDE 2.5 MG/ML
INJECTION, SOLUTION INTRAVENOUS PRN
Status: DISCONTINUED | OUTPATIENT
Start: 2025-04-03 | End: 2025-04-03 | Stop reason: HOSPADM

## 2025-04-03 RX ADMIN — METOPROLOL TARTRATE 12.5 MG: 25 TABLET, FILM COATED ORAL at 20:21

## 2025-04-03 RX ADMIN — ENOXAPARIN SODIUM 40 MG: 100 INJECTION SUBCUTANEOUS at 17:01

## 2025-04-03 RX ADMIN — SODIUM CHLORIDE: 0.9 INJECTION, SOLUTION INTRAVENOUS at 12:10

## 2025-04-03 RX ADMIN — ACETAMINOPHEN 650 MG: 325 TABLET ORAL at 15:08

## 2025-04-03 RX ADMIN — ATORVASTATIN CALCIUM 80 MG: 80 TABLET, FILM COATED ORAL at 20:21

## 2025-04-03 ASSESSMENT — PAIN SCALES - GENERAL: PAINLEVEL_OUTOF10: 4

## 2025-04-03 NOTE — PROGRESS NOTES
Patient:   Sharmin Petersen  MR#:    [id number]   Room:    [facility]/522-   YOB: 1960  Date of Progress Note: 4/3/2025  Time of Note                           8:04 AM  Consulting Physician:   Carson Waldron M.D.  Attending Physician:  Nabil Hill MD       CHIEF COMPLAINT: Left-sided weakness  Subjective:  This 65 y.o. female who presents with left-sided weakness.  ED note says that she came from a nursing home but the patient tells me she lives at home by herself.  Had 1 or 2 episodes of left-sided weakness and came in to the ED at [facility] initially and was sent back home and then returned to this ED on 3/30.  Reportedly on no antiplatelet drugs.  CTA of the head and neck read as showing at least 90% proximal right ICA stenosis and 80% on the left.  CT of the brain relatively unremarkable as were routine blood studies.   Plavix and aspirin were added.  Also on Lipitor.  Patient reportedly with a history of dementia.  No complaints today.  Remains mildly confused  REVIEW OF SYSTEMS:  Constitutional: No fevers No chills  Neck:No stiffness  Respiratory: No shortness of breath  Cardiovascular: No chest pain No palpitations  Gastrointestinal: No abdominal pain    Genitourinary: No Dysuria  Neurological: No headache, no seizures      PHYSICAL EXAM:  BP (!) 142/42   Pulse (!) 45   Temp 96.8 °F (36 °C) (Temporal)   Resp 16   Ht 1.626 m (5' 4\")   Wt 81.6 kg (180 lb)   LMP  (LMP Unknown)   SpO2 100%   BMI 30.90 kg/m²     Constitutional: she appears well-developed and well-nourished.   Eyes - conjunctiva normal.  Pupils react to light  Ear, nose, throat -hearing intact to voice. No scars, masses, or lesions over external nose or ears, no atrophy of tongue  Neck-symmetric, no masses noted, no jugular vein distension  Respiration- chest wall appears symmetric, good expansion,   normal effort without use of accessory muscles  Cardiovascular- RRR  Musculoskeletal - no significant wasting of  muscles noted, no bony deformities, gait no gross ataxia  Extremities-no clubbing, cyanosis or edema  Skin - warm, dry, and intact.  No rash, erythema, or pallor.  Psychiatric - mood, affect, and behavior appear normal.      Neurology  NEUROLOGICAL EXAM:      Mental status   Awake, alert, fluent.  Oriented to hospital in [place].  Slow to respond at times.  Speech normal without dysarthria  No clear issues with language       Cranial Nerves   CN II- Visual fields grossly unremarkable  CN III, IV,VI-EOMI, No nystagmus, conjugate eye movements, no ptosis  CN VII-no facial asymmetry  CN VIII-Hearing intact   CN IX and X- Palate elevates in midline  CN XI-good shoulder shrug  CN XII-Tongue midline with no fasciculations or fibrillations          Motor function  Normal on the right arm and leg and left leg.  Left upper extremity 4/5 with ataxia and possibly increased tone noted there.       Nursing/pcp notes, imaging,labs and vitals reviewed.         Lab Results   Component Value Date    WBC 8.1 04/02/2025    HGB 10.6 (L) 04/02/2025    HCT 32.3 (L) 04/02/2025    MCV 94.2 04/02/2025     04/02/2025     Lab Results   Component Value Date     04/02/2025    K 3.8 04/02/2025     04/02/2025    CO2 24 04/02/2025    BUN 15 04/02/2025    CREATININE 0.9 04/02/2025    GLUCOSE 146 (H) 04/02/2025    CALCIUM 8.6 (L) 04/02/2025    BILITOT 0.5 03/30/2025    ALKPHOS 88 03/30/2025    AST 18 03/30/2025    ALT 10 03/30/2025    LABGLOM 70 04/02/2025     Lab Results   Component Value Date    INR 1.16 03/30/2025   No results found for: \"PHENYTOIN\", \"ESR\", \"CRP\"    PT,OT and/or speech notes reviewed:  Echocardiogram:      Left Ventricle: Moderately reduced left ventricular systolic function with a visually estimated EF of 30 - 35%. Left ventricle is dilated. Normal wall thickness. Septal motion is consistent with bundle branch block. Dyskinesis of the following segments: mid anteroseptal. Grade I diastolic dysfunction with

## 2025-04-03 NOTE — PROGRESS NOTES
Cardiology brief note    The patient has high risk nuclear stress test findings and there are tentative plans for carotid endarterectomy.  Given this, recommendation would be for coronary angiogram to rule out any critical proximal vessel disease that would further increase the perioperative cardiovascular events of her vascular surgery.    As the patient does not have decision-making capacity, I called her granddaughter Tiffanie ([phone]) who makes medical decisions for her grandmother.    I discussed in detail with Tiffanie regarding the risks benefits and indications for coronary angiogram including life-threatening bleeding, stroke, contrast related naphthyl access, contrast-induced nephropathy and death.    I explained to her that the procedure today would be to define her coronary anatomy for the reasons mentioned above.    Verbal consent was obtained by myself and independently verified by Rivka SIMONS.    Will proceed with coronary angiogram today.    Janiya Lyons MD  4/3/2025

## 2025-04-03 NOTE — PROGRESS NOTES
Will hold on CEA until cardiac cath based on abnormal stress test.  I discussed with Dr. Lyons and the patient's nurse.   I will discuss with Dr. Coleman who will start on Monday as I am out of town. She should be able to do right CEA vs. TCAR next week.

## 2025-04-03 NOTE — PROGRESS NOTES
Brief op note    Unable to perform radial cardiac catheterization due to retroflexed radial artery  Cardiac catheterization performed via right groin approach    Severe multivessel CAD including left main disease  Severe bilateral ostial common iliac disease    Will have CT surgery see the patient though I suspect she would not be a candidate for CABG.    High risk for perioperative cardiovascular events for vascular surgery.    Dr. Glaser has been notified via text (unable to reach via phone).    Janiya Lyons MD

## 2025-04-03 NOTE — TELEPHONE ENCOUNTER
Dr Lyons and  I received verbal consent for Left Heart Cath today per Dr Lyons. Notified Pt's nurse on 5th floor

## 2025-04-03 NOTE — PROGRESS NOTES
Cardiology Daily Note Janiya Lyons MD      Patient:  Sharmin Petersen  [id number]    Patient Active Problem List    Diagnosis Date Noted    Acute left-sided weakness 04/01/2025    Acute systolic congestive heart failure (HCC) 04/01/2025    Symptomatic stenosis of right carotid artery 04/02/2025    Asymptomatic stenosis of left carotid artery 04/02/2025    Abnormal cardiovascular stress test 04/02/2025    Stroke determined by clinical assessment ([facility]) 03/30/2025       Admit Date:  3/30/2025    Admission Problem List: Present on Admission:   Stroke determined by clinical assessment ([facility])   Acute left-sided weakness   Acute systolic congestive heart failure (HCC)   Symptomatic stenosis of right carotid artery   Asymptomatic stenosis of left carotid artery      Cardiac Specific Data:  Specialty Problems          Cardiology Problems    Acute systolic congestive heart failure (HCC)        * (Principal) Stroke determined by clinical assessment ([facility])        Asymptomatic stenosis of left carotid artery        Symptomatic stenosis of right carotid artery           Subjective:  Ms. Petersen is is seen postcardiac catheterization.  She is overall doing well.  Case discussed with CT surgery and vascular surgery.    Objective:   BP (!) 148/62   Pulse 54   Temp 96.9 °F (36.1 °C) (Temporal)   Resp 16   Ht 1.626 m (5' 4\")   Wt 81.6 kg (180 lb)   LMP  (LMP Unknown)   SpO2 99%   BMI 30.90 kg/m²           Assessment:  Acute right-sided CVA  Multivessel coronary artery disease  PVD: Severe right ICA stenosis and moderate to severe left ICA stenosis and severe bilateral ostial common iliac and external iliac artery stenoses  Newly diagnosed LV dysfunction without heart failure exacerbation  COPD  Diabetes  Dementia  Questionable medication compliance    Plan:  Patient's cardiac catheterization today demonstrated severe multivessel coronary artery disease as well as severe bilateral common and external iliac stenoses.  Given the

## 2025-04-03 NOTE — PROGRESS NOTES
Physical Therapy  Name: Sharmin Petersen  MRN:  [id number]  Date of service:  4/3/2025       04/03/25 0915   Subjective   Subjective Pt agreed to therapy.   Pain Assessment   Pain Assessment None - Denies Pain   Transfers   Sit to Stand Partial/Moderate assistance   Stand to Sit Partial/Moderate assistance;Minimal Assistance   Ambulation   Surface Level tile   Device Rolling Walker   Assistance Minimal assistance;Partial/Moderate assistance   Quality of Gait unsteady, cues and assist to weight shift   Gait Deviations Decreased step length;Decreased step height   Distance 2 steps fwd/bwd 2x   Comments pt also worked on marching in place with RW   Short Term Goals   Time Frame for Short Term Goals 2 WKS   Short Term Goal 1 SUP<>SIT, MIN A   Short Term Goal 2 SIT<>STAND, MIN A   Short Term Goal 3 TF'S WITH AD AS INDICATED, MIN A   Short Term Goal 4 AMB 25 FT WITH AD AS INDICATED, MIN A   Conditions Requiring Skilled Therapeutic Intervention   Body Structures, Functions, Activity Limitations Requiring Skilled Therapeutic Intervention Decreased functional mobility ;Decreased cognition;Decreased safe awareness;Decreased strength;Decreased balance;Decreased fine motor control   Assessment Pt worked on standing and stepping, continues to have some posterior lean but decreased from previous treatment. Improved step height with marching in place on LLE. Assisted back to chair with all needs in reach.   Activity Tolerance   Activity Tolerance Patient tolerated treatment well   PT Plan of Care   Thursday X   Safety Devices   Type of Devices Call light within reach;Chair alarm in place;Left in chair         Electronically signed by Shilpa Fine PTA on 4/3/2025 at 10:54 AM

## 2025-04-03 NOTE — PROGRESS NOTES
to 35%, LV is dilated, normal wall thickness, septal motion is consistent with bundle branch block, mid anteroseptal dyskinesis, grade 1 diastolic dysfunction, normal RV size with mildly reduced systolic function, mild MR, negative bubble study. Cardiology consulted for new findings of LV dysfunction without exacerbation. MRI indicated multiple small foci of acute to subacute ischemia in the right frontal, parietal and occipital lobes, could reflect showering emboli, encephalomalacia and gliosis in the right frontal lobe, small vessel disease and age-related involution. Cardiology suggested Lexiscan and GDMT. Lexiscan indicated study is positive for myocardial ischemia, suggest a high risk of cardiac events, post-stress EF is 27%. Cardiology suggested cardiac cath. Underwent cath this morning with findings of multivessel CAD including severe left main, mid LAD, ostial and mid RCA, distal circumflex stenosis. Cardiology suggested CT surgery evaluation.     Vascular surgery suggested continuing Plavix and ASA, Awaiting date for right carotid endarterectomy.     Case management assisting with DC planning to SNF, can DC on 4/3 if stable.       Review of Systems   Unable to perform ROS: Dementia        Objective:   VITALS:  BP (!) 137/39   Pulse (!) 43   Temp 97.2 °F (36.2 °C) (Temporal)   Resp 16   Ht 1.626 m (5' 4\")   Wt 81.6 kg (180 lb)   LMP  (LMP Unknown)   SpO2 99%   BMI 30.90 kg/m²   24HR INTAKE/OUTPUT:    Intake/Output Summary (Last 24 hours) at 4/3/2025 1427  Last data filed at 4/3/2025 1123  Gross per 24 hour   Intake --   Output 10 ml   Net -10 ml       Physical Exam  Constitutional:       General: She is not in acute distress.     Appearance: Normal appearance. She is not toxic-appearing or diaphoretic.   HENT:      Head: Normocephalic and atraumatic.      Right Ear: External ear normal.      Left Ear: External ear normal.      Nose: Nose normal. No congestion or rhinorrhea.      Mouth/Throat:       Date: 3/30/2025  EXAMINATION: CT HEAD WO CONTRAST- 3/30/2025 12:38 PM HISTORY: Weakness. DOSE: 639 mGy-cm (Automatic exposure control technique was implemented in an effort to keep the radiation dose as low as possible without compromising image quality) REPORT: Spiral CT of the head was performed without contrast, reconstructed coronal and sagittal images were also reviewed. COMPARISON: CT head without contrast 6/2/2017. No intracranial hemorrhage, mass or mass effect is identified. Small area of chronic appearing encephalomalacia in the right frontal lobe and there are advanced chronic small vessel white matter ischemic changes which have progressed. These findings include a chronic infarct in the anterior limb of the right internal capsule. The ventricles and basal cisterns are within normal limits. Review of bone windows shows no acute osseous abnormality. Normal aeration of the sinuses and mastoid air cells. No fracture is identified.    1. No intracranial hemorrhage or acute findings. Advanced chronic small vessel white matter ischemic disease and more focal chronic appearing ischemic changes within the right periventricular white matter, including the anterior limb of the right internal capsule and subcortical white matter at the right frontal vertex. This report was signed and finalized on 3/30/2025 12:42 PM by Dr. Austin Adams MD.    XR CHEST 1 VIEW  Result Date: 3/30/2025  EXAMINATION: XR CHEST 1 VW- 3/30/2025 12:21 PM HISTORY: Weakness. COMPARISON: Chest x-ray 6/2/2017. REPORT: The lungs are hypoaerated, no focal infiltrate or pulmonary consolidation is identified. No pneumothorax or effusion is identified. Heart size is normal. The osseous structures show no acute findings. Calcification in the subacromial interval of the right shoulder may be related to calcific tendinitis or HADD.    Shallow inspiration, no acute cardiopulmonary abnormality. This report was signed and finalized on 3/30/2025 12:22 PM

## 2025-04-04 VITALS
TEMPERATURE: 97.2 F | OXYGEN SATURATION: 99 % | SYSTOLIC BLOOD PRESSURE: 154 MMHG | DIASTOLIC BLOOD PRESSURE: 54 MMHG | RESPIRATION RATE: 16 BRPM | WEIGHT: 180 LBS | HEART RATE: 61 BPM | HEIGHT: 64 IN | BODY MASS INDEX: 30.73 KG/M2

## 2025-04-04 PROBLEM — Z51.5 PALLIATIVE CARE PATIENT: Status: ACTIVE | Noted: 2025-04-04

## 2025-04-04 PROBLEM — I42.9 CARDIOMYOPATHY: Status: ACTIVE | Noted: 2025-04-04

## 2025-04-04 LAB
ANION GAP SERPL CALCULATED.3IONS-SCNC: 8 MMOL/L (ref 8–16)
BASOPHILS # BLD: 0.1 K/UL (ref 0–0.2)
BASOPHILS NFR BLD: 0.7 % (ref 0–1)
BUN SERPL-MCNC: 13 MG/DL (ref 8–23)
CALCIUM SERPL-MCNC: 8.8 MG/DL (ref 8.8–10.2)
CHLORIDE SERPL-SCNC: 109 MMOL/L (ref 98–107)
CO2 SERPL-SCNC: 23 MMOL/L (ref 22–29)
CREAT SERPL-MCNC: 0.9 MG/DL (ref 0.5–0.9)
EOSINOPHIL # BLD: 0.2 K/UL (ref 0–0.6)
EOSINOPHIL NFR BLD: 2.3 % (ref 0–5)
ERYTHROCYTE [DISTWIDTH] IN BLOOD BY AUTOMATED COUNT: 12.8 % (ref 11.5–14.5)
GLUCOSE SERPL-MCNC: 138 MG/DL (ref 70–99)
HCT VFR BLD AUTO: 34.2 % (ref 37–47)
HGB BLD-MCNC: 10.9 G/DL (ref 12–16)
IMM GRANULOCYTES # BLD: 0 K/UL
LYMPHOCYTES # BLD: 1.7 K/UL (ref 1.1–4.5)
LYMPHOCYTES NFR BLD: 18.2 % (ref 20–40)
MCH RBC QN AUTO: 30.7 PG (ref 27–31)
MCHC RBC AUTO-ENTMCNC: 31.9 G/DL (ref 33–37)
MCV RBC AUTO: 96.3 FL (ref 81–99)
MONOCYTES # BLD: 0.5 K/UL (ref 0–0.9)
MONOCYTES NFR BLD: 5.3 % (ref 0–10)
NEUTROPHILS # BLD: 6.7 K/UL (ref 1.5–7.5)
NEUTS SEG NFR BLD: 73.3 % (ref 50–65)
PLATELET # BLD AUTO: 221 K/UL (ref 130–400)
PMV BLD AUTO: 11.9 FL (ref 9.4–12.3)
POTASSIUM SERPL-SCNC: 3.9 MMOL/L (ref 3.5–5)
RBC # BLD AUTO: 3.55 M/UL (ref 4.2–5.4)
SODIUM SERPL-SCNC: 140 MMOL/L (ref 136–145)
WBC # BLD AUTO: 9.1 K/UL (ref 4.8–10.8)

## 2025-04-04 PROCEDURE — 97530 THERAPEUTIC ACTIVITIES: CPT

## 2025-04-04 PROCEDURE — 99232 SBSQ HOSP IP/OBS MODERATE 35: CPT | Performed by: PSYCHIATRY & NEUROLOGY

## 2025-04-04 PROCEDURE — 6370000000 HC RX 637 (ALT 250 FOR IP): Performed by: PSYCHIATRY & NEUROLOGY

## 2025-04-04 PROCEDURE — 36415 COLL VENOUS BLD VENIPUNCTURE: CPT

## 2025-04-04 PROCEDURE — 99223 1ST HOSP IP/OBS HIGH 75: CPT | Performed by: SURGERY

## 2025-04-04 PROCEDURE — 6370000000 HC RX 637 (ALT 250 FOR IP): Performed by: INTERNAL MEDICINE

## 2025-04-04 PROCEDURE — 94760 N-INVAS EAR/PLS OXIMETRY 1: CPT

## 2025-04-04 PROCEDURE — 6370000000 HC RX 637 (ALT 250 FOR IP): Performed by: NURSE PRACTITIONER

## 2025-04-04 PROCEDURE — 99223 1ST HOSP IP/OBS HIGH 75: CPT

## 2025-04-04 PROCEDURE — 97535 SELF CARE MNGMENT TRAINING: CPT

## 2025-04-04 PROCEDURE — 80048 BASIC METABOLIC PNL TOTAL CA: CPT

## 2025-04-04 PROCEDURE — 85025 COMPLETE CBC W/AUTO DIFF WBC: CPT

## 2025-04-04 RX ORDER — METOPROLOL TARTRATE 25 MG/1
12.5 TABLET, FILM COATED ORAL 2 TIMES DAILY
Qty: 60 TABLET | Refills: 0 | Status: SHIPPED | OUTPATIENT
Start: 2025-04-04

## 2025-04-04 RX ORDER — LISINOPRIL 2.5 MG/1
2.5 TABLET ORAL DAILY
Qty: 30 TABLET | Refills: 0 | Status: SHIPPED | OUTPATIENT
Start: 2025-04-05

## 2025-04-04 RX ORDER — ATORVASTATIN CALCIUM 80 MG/1
80 TABLET, FILM COATED ORAL NIGHTLY
Qty: 30 TABLET | Refills: 0 | Status: SHIPPED | OUTPATIENT
Start: 2025-04-04

## 2025-04-04 RX ORDER — ASPIRIN 81 MG/1
81 TABLET, CHEWABLE ORAL DAILY
Qty: 30 TABLET | Refills: 0 | Status: SHIPPED | OUTPATIENT
Start: 2025-04-05

## 2025-04-04 RX ORDER — CLOPIDOGREL BISULFATE 75 MG/1
75 TABLET ORAL DAILY
Qty: 30 TABLET | Refills: 0 | Status: SHIPPED | OUTPATIENT
Start: 2025-04-05

## 2025-04-04 RX ADMIN — ASPIRIN 81 MG: 81 TABLET, CHEWABLE ORAL at 08:19

## 2025-04-04 RX ADMIN — LISINOPRIL 2.5 MG: 5 TABLET ORAL at 08:19

## 2025-04-04 RX ADMIN — METOPROLOL TARTRATE 12.5 MG: 25 TABLET, FILM COATED ORAL at 19:46

## 2025-04-04 RX ADMIN — ATORVASTATIN CALCIUM 80 MG: 80 TABLET, FILM COATED ORAL at 19:46

## 2025-04-04 RX ADMIN — SPIRONOLACTONE 25 MG: 25 TABLET ORAL at 08:19

## 2025-04-04 RX ADMIN — CLOPIDOGREL BISULFATE 75 MG: 75 TABLET, FILM COATED ORAL at 08:19

## 2025-04-04 ASSESSMENT — ENCOUNTER SYMPTOMS
TROUBLE SWALLOWING: 0
CONSTIPATION: 0
WHEEZING: 0
DIARRHEA: 0
SINUS PAIN: 0
CHEST TIGHTNESS: 0
COLOR CHANGE: 0
BLOOD IN STOOL: 0
NAUSEA: 0
SHORTNESS OF BREATH: 0
APNEA: 0
PHOTOPHOBIA: 0

## 2025-04-04 NOTE — DISCHARGE SUMMARY
[facility]    Discharge Summary      Sharmin Petersen  :  1960  MRN:  [id number]    Admit date:  3/30/2025  Discharge date:    2025    Discharging Physician:  Dr. Nabil Hill     Advance Directive: DNR    Consults: cardiology, neurology, vascular surgery, CT surgery and Palliative care     Primary Care Physician:  No primary care provider on file.    Discharge Diagnoses:  Principal Problem:    Stroke determined by clinical assessment ([facility])  Active Problems:    Acute left-sided weakness    Acute systolic congestive heart failure (HCC)    Symptomatic stenosis of right carotid artery    Asymptomatic stenosis of left carotid artery    Abnormal cardiovascular stress test    Palliative care patient    Cardiomyopathy  Resolved Problems:    * No resolved hospital problems. *      Portions of this note have been copied forward, however, changed to reflect the most current clinical status of this patient.    Hospital Course:   The patient is a 65 y.o. female with PMH of COPD, stroke, Dementia, hypertension and hyperlipidemia who presented to [facility] ED for evaluation of strokelike symptoms.  Reportedly patient was noted to have left-sided weakness day prior to admission at nursing home. Reportedly was seen at [facility] ED earlier and had improvement initially. Was noted to have return of left arm weakness and brought to [facility] ED for further evaluation. Workup in ED revealed CT head-No acute intracranial abnormality. CTA head/neck-.  No large vessel occlusion seen in the intracranial circulation.  There is some stenosis of the right cavernous carotid artery and right middle cerebral artery at least 50%. High-grade stenosis of the right proximal internal carotid artery which is difficult to measure secondary calcification.  Stenosis at least 90%. 80% stenosis left proximal internal carotid artery. Patient was admitted to hospital medicine for further evaluation with neurology consultation. Neurology suggested suspicious  ______________________________________ Electronically signed by: NILDA GARCIA D.O. Date:     03/31/2025 Time:    18:23     Echo (TTE) complete (PRN contrast/bubble/strain/3D)  Result Date: 3/31/2025    Left Ventricle: Moderately reduced left ventricular systolic function with a visually estimated EF of 30 - 35%. Left ventricle is dilated. Normal wall thickness. Septal motion is consistent with bundle branch block. Dyskinesis of the following segments: mid anteroseptal. Grade I diastolic dysfunction with normal LAP. Calcified chord noted.   Right Ventricle: Right ventricle size is normal. Mildly reduced systolic function.   Aortic Valve: Trileaflet valve. Mildly thickened cusps. No regurgitation. No stenosis.   Mitral Valve: Annular calcification. Mildly thickened leaflets. Mild regurgitation. No stenosis noted. MV mean gradient is 2 mmHg.   Interatrial Septum: Agitated saline study was negative with and without provocation.   Pericardium: Trivial pericardial effusion present. No indication of cardiac tamponade.   Image quality is suboptimal. Contrast used: Lumason.     CTA HEAD NECK W CONTRAST  Result Date: 3/30/2025  EXAM:  CT ANGIOGRAPHY OF THE HEAD AND NECK  Technique:  0.6 mm postcontrast CT head and neck utilizing CT angiography protocol.  Multiplanar and three-dimensional reformations were performed.  FINDINGS:  Head:  Postcontrast shows no abnormal enhancement.  No abnormal draining veins.  Dural venous sinuses not well opacified secondary to arterial timing.  Patent skull base carotids.  Atherosclerotic calcification of the cavernous carotid arteries.  There is nonocclusive narrowing of the ophthalmic segment of the right carotid artery.  Patent middle cerebral arteries.  The right M1 segment shows at least 50% narrowing.  Patent posterior cerebral arteries.  The right posterior cerebral artery shows dominant anterior contribution.  The left posterior cerebral artery shows dominant posterior

## 2025-04-04 NOTE — PROGRESS NOTES
wasting of muscles noted, no bony deformities, gait no gross ataxia  Extremities-no clubbing, cyanosis or edema  Skin - warm, dry, and intact.  No rash, erythema, or pallor.  Psychiatric - mood, affect, and behavior appear normal.      Neurology  NEUROLOGICAL EXAM:      Mental status   Awake, alert, fluent.  Oriented to hospital i but cannot tell me the city.  Slow to respond at times.  Speech normal without dysarthria  No clear issues with language       Cranial Nerves   CN II- Visual fields grossly unremarkable  CN III, IV,VI-EOMI, No nystagmus, conjugate eye movements, no ptosis  CN VII-no facial asymmetry  CN VIII-Hearing intact   CN IX and X- Palate elevates in midline  CN XI-good shoulder shrug  CN XII-Tongue midline with no fasciculations or fibrillations          Motor function  Normal on the right arm and leg and left leg.  Left upper extremity 4/5 with ataxia and possibly increased tone noted there.       Nursing/pcp notes, imaging,labs and vitals reviewed.         Lab Results   Component Value Date    WBC 9.1 04/04/2025    HGB 10.9 (L) 04/04/2025    HCT 34.2 (L) 04/04/2025    MCV 96.3 04/04/2025     04/04/2025     Lab Results   Component Value Date     04/04/2025    K 3.9 04/04/2025     (H) 04/04/2025    CO2 23 04/04/2025    BUN 13 04/04/2025    CREATININE 0.9 04/04/2025    GLUCOSE 138 (H) 04/04/2025    CALCIUM 8.8 04/04/2025    BILITOT 0.5 03/30/2025    ALKPHOS 88 03/30/2025    AST 18 03/30/2025    ALT 10 03/30/2025    LABGLOM 70 04/04/2025     Lab Results   Component Value Date    INR 1.16 03/30/2025   No results found for: \"PHENYTOIN\", \"ESR\", \"CRP\"    PT,OT and/or speech notes reviewed:  Echocardiogram:      Left Ventricle: Moderately reduced left ventricular systolic function with a visually estimated EF of 30 - 35%. Left ventricle is dilated. Normal wall thickness. Septal motion is consistent with bundle branch block. Dyskinesis of the following segments: mid anteroseptal. Grade I  03/31/2025 at 1829 hours.  2. Encephalomalacia and gliosis in the right frontal lobe.  3. Small vessel disease and age-related involution.  4. Nearly empty appearance of the sella.                 .        ______________________________________   Electronically signed by: NILDA GARCIA D.O.  Date:     03/31/2025    Carotid Dopplers:      Vascular lab preliminary.  Bilateral carotid duplex scan completed.  Right ICA 70-99% stenosis.  Left ICA  50-69% stenosis.  Bilateral vertebral arteries antegrade flow.  Final report pending.                   RECORD REVIEW: Previous medical records, medications were reviewed at today's visit    IMPRESSION:   Patient with high-grade right carotid stenosis and apparently showering emboli intermittently into the right hemisphere.    Now on aspirin and Plavix. .  Continue statin.  Permissive hypertension.  Awaiting date for right carotid endarterectomy.  Continue PT/OT/SLP.  Patient encouraged.  Diffuse CAD noted.  Will see less often

## 2025-04-04 NOTE — PROGRESS NOTES
exacerbation  COPD  Diabetes  Dementia  Questionable medication compliance    Plan:  Patient's cardiac catheterization today demonstrated severe multivessel coronary artery disease as well as severe bilateral common and external iliac stenoses.  Given the patient's dementia, no angina and severe diffuse coronary artery disease involving the entirety of the left main, I do not believe PCI is a good option for her.  I have discussed this with the granddaughter, Tiffanie.  She has been seen by , CT surgery, who does not believe she is a good surgical candidate either  The patient is at a high risk for perioperative cardiovascular events for any noncardiac surgeries at this time.  I have relayed this to vascular surgery.  Continue with GDMT for her heart failure        Intake/Output Summary (Last 24 hours) at 4/4/2025 1359  Last data filed at 4/4/2025 1020  Gross per 24 hour   Intake 480 ml   Output 400 ml   Net 80 ml       Prior to Admission medications    Medication Sig Start Date End Date Taking? Authorizing Provider   diclofenac (VOLTAREN) 50 MG EC tablet Take 1 tablet by mouth 2 times daily 9/26/24   Zainab Smalls APRN   memantine (NAMENDA) 5 MG tablet Take 1 tablet by mouth daily 8/31/24   Jana Guillen MD   umeclidinium-vilanterol (ANORO ELLIPTA) 62.5-25 MCG/ACT inhaler Inhale 1 puff into the lungs daily 8/9/24   Zainab Smalls APRN   metFORMIN (GLUCOPHAGE) 500 MG tablet Take 1 tablet by mouth 2 times daily (with meals) 7/30/24   Zainab Smalls APRN   irbesartan (AVAPRO) 150 MG tablet Take 1 tablet by mouth daily 7/26/24   Zainab Smalls APRN   acetaminophen (TYLENOL) 325 MG tablet Take 2 tablets by mouth every 6 hours as needed for Pain    Provider, MD Nathan   diphenhydrAMINE (BENADRYL) 25 MG capsule Take 2 capsules by mouth nightly as needed for Itching    Provider, MD Nathan        metoprolol tartrate  12.5 mg Oral BID    spironolactone  25 mg Oral Daily    lisinopril  2.5 mg Oral Daily     segment shows at least 50% narrowing.  Patent posterior cerebral arteries.  The right posterior cerebral artery shows dominant anterior contribution.  The left posterior cerebral artery shows dominant posterior contribution.  Patent basilar artery and intracranial vertebral arteries.  Neck:  No acute soft tissue abnormality of the neck.  Patent branch vessels from the arch.  The right common carotid artery shows atherosclerotic change and irregularity.  No significant stenosis.  Atherosclerotic change of the right internal carotid artery with high-grade stenosis.  The luminal diameter is difficult to measure secondary to heavy calcification.  The left common carotid artery is patent with atherosclerotic change.  The internal carotid artery is narrowed 80%.  Vertebral arteries are patent with left dominant.      Impression: 1.  No large vessel occlusion seen in the intracranial circulation.  There is some stenosis of the right cavernous carotid artery and right middle cerebral artery at least 50%. 2.  High-grade stenosis of the right proximal internal carotid artery which is difficult to measure secondary calcification.  Stenosis at least 90%. 3.  80% stenosis left proximal internal carotid artery 4.  Patent vertebral arteries  All CT scans are performed using dose optimization techniques as appropriate to the performed exam and include at least one of the following: Automated exposure control, adjustment of the mA and/or kV according to size, and the use of iterative reconstruction technique.  ______________________________________ Electronically signed by: NILDA GONZALEZ M.D. Date:     03/30/2025 Time:    19:48     XR CHEST PORTABLE  Result Date: 3/30/2025  EXAM: CHEST, ONE-VIEW  HISTORY:  Code stroke  FINDINGS: Cardiac and mediastinal contours are normal.  Pulmonary vasculature is normal.  Lungs are clear.  Atherosclerotic calcification of the aorta.  Bony thorax is unremarkable.      No acute cardiopulmonary

## 2025-04-04 NOTE — PROGRESS NOTES
recommendations, positioning,and safety recommendations       Tx Time  Minutes  40             Eliza Perdomo OT/LOUIS  Electronically signed by Eliza Perdomo OT on 4/4/2025 at 2:05 PM

## 2025-04-04 NOTE — CARE COORDINATION
Patient's granddaughter have accepted offer from [facility]. Patient can discharge 04/04 if medically stable.  [facility]   P   F ()   F (Ref to Elizabeth Bull)  Electronically signed by Aditya Armstrong RN, BSN on 4/4/2025 at 10:35 AM

## 2025-04-04 NOTE — DISCHARGE INSTR - DIET

## 2025-04-04 NOTE — CONSULTS
Palliative Care Consult Note    4/4/2025 12:44 PM  Subjective:  Admit Date: 3/30/2025  PCP: No primary care provider on file.    Date of Service: 4/4/2025    Reason for Consultation:  Goals of Care, Code Status, Family Support     History Obtained From: EMR/Patient and their Family    HISTORY OF PRESENT ILLNESS     The patient is a 65 y.o. female with a PMH of CVA, dementia, hyperlipidemia, hypertension, EF 30-35% and COPD who presented to the ED from [facility] with concern of new onset left-sided weakness.  Evidently the weakness might've started the day prior per NH staff.  EMS originally transported the patient to [facility] and they noted she had flaccid left-sided paralysis with facial droop that resolved upon arrival to the hospital.  Negative CT of the head and patient discharged back to nursing facility.  She returned to [facility] emergency department same-day when nursing staff saw a recurrence of weakness. Neurology consult and MRI revealed multiple small foci of actue to subacute ischemia in the right frontal, parietal, and occipital lobes reflecting possible showering emboli. CTA of head and neck revealed high-grade stenosis of the right proximal internal carotid artery at least 90% and 80% stenosis in the left proximal internal carotid artery. Referral to vascular surgery who requested further cardiac clearance due to abnormal stress test. Patient underwent cardiac catheterization which revealed severe multivessel CAD including left main disease and severe bilateral ostial common iliac disease and was referred to CT surgery. Patient determined to not be a candidate for CABG and a poor surgical candidate.  Palliative care was consulted to discuss goals of care and code status with patient and family.     Past Medical History:        Diagnosis Date    COPD (chronic obstructive pulmonary disease) (HCC)     CVA (cerebral vascular accident) (HCC)     Dementia (HCC)     Diabetes mellitus (HCC)      hydrocephalus.  No midline shift.  Parenchymal volume loss.  Chronic microangiopathy.  The calvarium is intact.  The intracranial appearance is grossly stable.       No acute intracranial abnormality.  If symptoms persist, consider follow-up MRI brain.  All CT scans are performed using dose optimization techniques as appropriate to the performed exam and include at least one of the following: Automated exposure control, adjustment of the mA and/or kV according to size, and the use of iterative reconstruction technique.  ______________________________________ Electronically signed by: CHARLY PARKER D.O. Date:     03/30/2025 Time:    19:15     CT Head Without Contrast  Result Date: 3/30/2025  EXAMINATION: CT HEAD WO CONTRAST- 3/30/2025 12:38 PM HISTORY: Weakness. DOSE: 639 mGy-cm (Automatic exposure control technique was implemented in an effort to keep the radiation dose as low as possible without compromising image quality) REPORT: Spiral CT of the head was performed without contrast, reconstructed coronal and sagittal images were also reviewed. COMPARISON: CT head without contrast 6/2/2017. No intracranial hemorrhage, mass or mass effect is identified. Small area of chronic appearing encephalomalacia in the right frontal lobe and there are advanced chronic small vessel white matter ischemic changes which have progressed. These findings include a chronic infarct in the anterior limb of the right internal capsule. The ventricles and basal cisterns are within normal limits. Review of bone windows shows no acute osseous abnormality. Normal aeration of the sinuses and mastoid air cells. No fracture is identified.    1. No intracranial hemorrhage or acute findings. Advanced chronic small vessel white matter ischemic disease and more focal chronic appearing ischemic changes within the right periventricular white matter, including the anterior limb of the right internal capsule and subcortical white matter at the right

## 2025-04-04 NOTE — ACP (ADVANCE CARE PLANNING)
Advance Care Planning      Palliative Medicine Provider   Advance Care Planning (ACP) Conversation      Date of Conversation: 04/04/25  The patient and/or authorized decision maker consented to a voluntary Advance Care Planning conversation.   Individuals present for the conversation:   Patient, Son (Chu Valera via telephone), Granddaughter (Charmaine Valera via telephone)    Legal Healthcare Agent(s):    Primary Decision Maker: Chu Valera - Child - [phone]    Primary Decision Maker: CHARMAINE VALERA - Grandchild - [phone]    ACP documents available in EMR prior to discussion:  None    Primary Palliative Diagnosis(es):  Severe multivessel disease  Dementia  Cardiomyopathy EF 30-35%  Bilateral carotid stenosis  Stroke    Conversation Summary:  I spoke with patient in her hospital room while she was alert in her recliner.  Patient was oriented to person, place, and situation. Disoriented to time. She has baseline dementia.  We discussed her diagnosis of severe multivessel heart disease and that she is not a candidate for surgery. We discussed that she is unfortunately high risk for future heart attack and stroke. Patient states that in the event of cardiopulmonary arrest that she would not want resuscitation efforts of CPR or to be placed on a ventilator.  Patient would like to continue with medical management and hospitalization if required as well as further diagnostic testing and aggressive treatment plans if she is a candidate for them.  I called and spoke to patient's son Chu Valera as well as her granddaughter Charmaine Valera who are listed as her primary decision makers.  Chu is in the process of POA paperwork but is her legal next of kin in [place] state law as her child.  Chu is agreeable that patient would want to have a DNR CODE STATUS.  This has been updated in her chart.    Resuscitation Status:    Code Status: DNR      Time to complete ACP services was accounted for in the initial consult visit.  Documentation completed with the patient and/or surrogate decision maker in a voluntary, in-person conversation as well as via telephone discussing the patient's wishes and goals as detailed in the above note.       Geeta Fatima PA-C    Electronically signed by MINH Garrison CNP on 4/4/2025 at 2:13 PM

## 2025-04-04 NOTE — CONSULTS
MINH Riley CNP    enoxaparin (LOVENOX) injection 40 mg, 40 mg, SubCUTAneous, Daily, Osvaldo Bruno APRN - CNP, 40 mg at 04/03/25 1701    aspirin chewable tablet 81 mg, 81 mg, Oral, Daily, 81 mg at 04/02/25 0921 **OR** aspirin suppository 300 mg, 300 mg, Rectal, Daily, Osvaldo Bruno APRN - CNP    atorvastatin (LIPITOR) tablet 80 mg, 80 mg, Oral, Nightly, Osvaldo Bruno APRN - CNP, 80 mg at 04/03/25 2021    Family History   Problem Relation Age of Onset    Cervical Cancer Mother     Heart Disease Father     Schizophrenia Sister           Current Facility-Administered Medications:     acetaminophen (TYLENOL) tablet 650 mg, 650 mg, Oral, Q4H PRN, Nabil Hill MD, 650 mg at 04/03/25 1508    metoprolol tartrate (LOPRESSOR) tablet 12.5 mg, 12.5 mg, Oral, BID, Janiya Lyons MD, 12.5 mg at 04/03/25 2021    spironolactone (ALDACTONE) tablet 25 mg, 25 mg, Oral, Daily, Janiya Lyons MD, 25 mg at 04/02/25 1534    lisinopril (PRINIVIL;ZESTRIL) tablet 2.5 mg, 2.5 mg, Oral, Daily, Janiya Lyons MD, 2.5 mg at 04/01/25 1455    clopidogrel (PLAVIX) tablet 75 mg, 75 mg, Oral, Daily, Carson Waldron MD, 75 mg at 04/02/25 0921    sodium chloride flush 0.9 % injection 5-40 mL, 5-40 mL, IntraVENous, PRN, Osvaldo Bruno APRN - CNP    ondansetron (ZOFRAN-ODT) disintegrating tablet 4 mg, 4 mg, Oral, Q8H PRN **OR** ondansetron (ZOFRAN) injection 4 mg, 4 mg, IntraVENous, Q6H PRN, Osvaldo Bruno APRN - CNP    polyethylene glycol (GLYCOLAX) packet 17 g, 17 g, Oral, Daily PRN, Osvaldo Bruno APRN - CNP    enoxaparin (LOVENOX) injection 40 mg, 40 mg, SubCUTAneous, Daily, Osvaldo Bruno, MINH - CNP, 40 mg at 04/03/25 1701    aspirin chewable tablet 81 mg, 81 mg, Oral, Daily, 81 mg at 04/02/25 0921 **OR** aspirin suppository 300 mg, 300 mg, Rectal, Daily, Osvaldo Bruno, MINH - CNP    atorvastatin (LIPITOR) tablet 80 mg, 80 mg, Oral, Nightly, Osvaldo Bruno, MINH - CNP, 80 mg at 04/03/25 2021     REVIEW OF SYSTEMS:  Review

## 2025-04-04 NOTE — DISCHARGE INSTRUCTIONS
Keep follow up appointments  Take medications as directed  Return to the ED for any recurrent or worsening symptoms or concerns  Please see attachment for heart cath care

## 2025-04-05 NOTE — PLAN OF CARE
Problem: Chronic Conditions and Co-morbidities  Goal: Patient's chronic conditions and co-morbidity symptoms are monitored and maintained or improved  4/4/2025 2134 by Venessa Peterson RN  Outcome: Progressing  Flowsheets (Taken 4/4/2025 1953)  Care Plan - Patient's Chronic Conditions and Co-Morbidity Symptoms are Monitored and Maintained or Improved: Monitor and assess patient's chronic conditions and comorbid symptoms for stability, deterioration, or improvement  4/4/2025 1408 by Maryam Ramirez RN  Outcome: Progressing  Flowsheets (Taken 4/4/2025 0819)  Care Plan - Patient's Chronic Conditions and Co-Morbidity Symptoms are Monitored and Maintained or Improved:   Monitor and assess patient's chronic conditions and comorbid symptoms for stability, deterioration, or improvement   Collaborate with multidisciplinary team to address chronic and comorbid conditions and prevent exacerbation or deterioration     Problem: Discharge Planning  Goal: Discharge to home or other facility with appropriate resources  4/4/2025 2134 by Venessa Peterson RN  Outcome: Progressing  Flowsheets (Taken 4/4/2025 1953)  Discharge to home or other facility with appropriate resources: Identify barriers to discharge with patient and caregiver  4/4/2025 1408 by Maryam Ramirez RN  Outcome: Progressing  Flowsheets (Taken 4/4/2025 0819)  Discharge to home or other facility with appropriate resources:   Arrange for needed discharge resources and transportation as appropriate   Identify barriers to discharge with patient and caregiver     Problem: Safety - Adult  Goal: Free from fall injury  4/4/2025 2134 by Venessa Peterson RN  Outcome: Progressing  Flowsheets (Taken 4/4/2025 1953)  Free From Fall Injury: Instruct family/caregiver on patient safety  4/4/2025 1408 by Maryam Ramirez RN  Outcome: Progressing     Problem: ABCDS Injury Assessment  Goal: Absence of physical injury  4/4/2025 2134 by Venessa Peterson RN  Outcome: Progressing  Flowsheets (Taken 4/4/2025  areas of redness and/or skin breakdown  2.  Assess vascular access sites hourly  3.  Every 4-6 hours minimum:  Change oxygen saturation probe site  4.  Every 4-6 hours:  If on nasal continuous positive airway pressure, respiratory therapy assess nares and determine need for appliance change or resting period  4/4/2025 2134 by Venessa Peterson, RN  Outcome: Progressing  Flowsheets (Taken 4/4/2025 1953)  Skin Integrity Remains Intact: Monitor for areas of redness and/or skin breakdown  4/4/2025 1408 by Maryam Ramirez RN  Outcome: Progressing  Flowsheets (Taken 4/4/2025 0819)  Skin Integrity Remains Intact:   Monitor for areas of redness and/or skin breakdown   Assess vascular access sites hourly     Problem: Pain  Goal: Verbalizes/displays adequate comfort level or baseline comfort level  4/4/2025 2134 by Venessa Peterson, RN  Outcome: Progressing  4/4/2025 1408 by Maryam Ramirez RN  Outcome: Progressing

## 2025-04-07 ENCOUNTER — LAB REQUISITION (OUTPATIENT)
Dept: LAB | Facility: HOSPITAL | Age: 65
End: 2025-04-07
Payer: MEDICARE

## 2025-04-07 DIAGNOSIS — I65.22 OCCLUSION AND STENOSIS OF LEFT CAROTID ARTERY: ICD-10-CM

## 2025-04-07 DIAGNOSIS — I63.311 CEREBRAL INFARCTION DUE TO THROMBOSIS OF RIGHT MIDDLE CEREBRAL ARTERY: ICD-10-CM

## 2025-04-07 DIAGNOSIS — I65.21 OCCLUSION AND STENOSIS OF RIGHT CAROTID ARTERY: ICD-10-CM

## 2025-04-07 DIAGNOSIS — I69.354 HEMIPLEGIA AND HEMIPARESIS FOLLOWING CEREBRAL INFARCTION AFFECTING LEFT NON-DOMINANT SIDE: ICD-10-CM

## 2025-04-07 DIAGNOSIS — F03.90 UNSPECIFIED DEMENTIA, UNSPECIFIED SEVERITY, WITHOUT BEHAVIORAL DISTURBANCE, PSYCHOTIC DISTURBANCE, MOOD DISTURBANCE, AND ANXIETY: ICD-10-CM

## 2025-04-07 LAB
ALBUMIN SERPL-MCNC: 3.2 G/DL (ref 3.5–5.2)
ALBUMIN/GLOB SERPL: 1.1 G/DL
ALP SERPL-CCNC: 86 U/L (ref 39–117)
ALT SERPL W P-5'-P-CCNC: 26 U/L (ref 1–33)
ANION GAP SERPL CALCULATED.3IONS-SCNC: 11 MMOL/L (ref 5–15)
AST SERPL-CCNC: 31 U/L (ref 1–32)
BILIRUB SERPL-MCNC: 0.3 MG/DL (ref 0–1.2)
BUN SERPL-MCNC: 22 MG/DL (ref 8–23)
BUN/CREAT SERPL: 26.5 (ref 7–25)
CALCIUM SPEC-SCNC: 8.7 MG/DL (ref 8.6–10.5)
CHLORIDE SERPL-SCNC: 108 MMOL/L (ref 98–107)
CHOLEST SERPL-MCNC: 122 MG/DL (ref 0–200)
CO2 SERPL-SCNC: 21 MMOL/L (ref 22–29)
CREAT SERPL-MCNC: 0.83 MG/DL (ref 0.57–1)
DEPRECATED RDW RBC AUTO: 45.4 FL (ref 37–54)
EGFRCR SERPLBLD CKD-EPI 2021: 77.9 ML/MIN/1.73
ERYTHROCYTE [DISTWIDTH] IN BLOOD BY AUTOMATED COUNT: 13.2 % (ref 12.3–15.4)
GLOBULIN UR ELPH-MCNC: 2.9 GM/DL
GLUCOSE SERPL-MCNC: 117 MG/DL (ref 65–99)
HBA1C MFR BLD: 5.6 % (ref 4.8–5.6)
HCT VFR BLD AUTO: 34 % (ref 34–46.6)
HDLC SERPL-MCNC: 51 MG/DL (ref 40–60)
HGB BLD-MCNC: 11.1 G/DL (ref 12–15.9)
LDLC SERPL CALC-MCNC: 52 MG/DL (ref 0–100)
LDLC/HDLC SERPL: 0.99 {RATIO}
MCH RBC QN AUTO: 30.9 PG (ref 26.6–33)
MCHC RBC AUTO-ENTMCNC: 32.6 G/DL (ref 31.5–35.7)
MCV RBC AUTO: 94.7 FL (ref 79–97)
PLATELET # BLD AUTO: 219 10*3/MM3 (ref 140–450)
PMV BLD AUTO: 12 FL (ref 6–12)
POTASSIUM SERPL-SCNC: 4.5 MMOL/L (ref 3.5–5.2)
PROT SERPL-MCNC: 6.1 G/DL (ref 6–8.5)
RBC # BLD AUTO: 3.59 10*6/MM3 (ref 3.77–5.28)
SODIUM SERPL-SCNC: 140 MMOL/L (ref 136–145)
TRIGL SERPL-MCNC: 103 MG/DL (ref 0–150)
VLDLC SERPL-MCNC: 19 MG/DL (ref 5–40)
WBC NRBC COR # BLD AUTO: 8.67 10*3/MM3 (ref 3.4–10.8)

## 2025-04-07 PROCEDURE — 85027 COMPLETE CBC AUTOMATED: CPT | Performed by: FAMILY MEDICINE

## 2025-04-07 PROCEDURE — 80053 COMPREHEN METABOLIC PANEL: CPT | Performed by: FAMILY MEDICINE

## 2025-04-07 PROCEDURE — 83036 HEMOGLOBIN GLYCOSYLATED A1C: CPT | Performed by: FAMILY MEDICINE

## 2025-04-07 PROCEDURE — 36415 COLL VENOUS BLD VENIPUNCTURE: CPT | Performed by: FAMILY MEDICINE

## 2025-04-07 PROCEDURE — 80061 LIPID PANEL: CPT | Performed by: FAMILY MEDICINE

## 2025-04-10 LAB
EKG P AXIS: 70 DEGREES
EKG P-R INTERVAL: 170 MS
EKG Q-T INTERVAL: 450 MS
EKG QRS DURATION: 164 MS
EKG QTC CALCULATION (BAZETT): 462 MS
EKG T AXIS: 124 DEGREES

## 2025-04-18 ENCOUNTER — LAB REQUISITION (OUTPATIENT)
Dept: LAB | Facility: HOSPITAL | Age: 65
End: 2025-04-18
Payer: MEDICARE

## 2025-04-18 DIAGNOSIS — I10 ESSENTIAL (PRIMARY) HYPERTENSION: ICD-10-CM

## 2025-04-18 DIAGNOSIS — E11.9 TYPE 2 DIABETES MELLITUS WITHOUT COMPLICATIONS: ICD-10-CM

## 2025-04-18 DIAGNOSIS — J44.9 CHRONIC OBSTRUCTIVE PULMONARY DISEASE, UNSPECIFIED: ICD-10-CM

## 2025-04-18 LAB
25(OH)D3 SERPL-MCNC: 10.4 NG/ML (ref 30–100)
ANION GAP SERPL CALCULATED.3IONS-SCNC: 13 MMOL/L (ref 5–15)
BUN SERPL-MCNC: 13 MG/DL (ref 8–23)
BUN/CREAT SERPL: 16.5 (ref 7–25)
CALCIUM SPEC-SCNC: 8.8 MG/DL (ref 8.6–10.5)
CHLORIDE SERPL-SCNC: 108 MMOL/L (ref 98–107)
CO2 SERPL-SCNC: 22 MMOL/L (ref 22–29)
CREAT SERPL-MCNC: 0.79 MG/DL (ref 0.57–1)
DEPRECATED RDW RBC AUTO: 48.8 FL (ref 37–54)
EGFRCR SERPLBLD CKD-EPI 2021: 82.6 ML/MIN/1.73
ERYTHROCYTE [DISTWIDTH] IN BLOOD BY AUTOMATED COUNT: 13.8 % (ref 12.3–15.4)
FOLATE SERPL-MCNC: 7.73 NG/ML (ref 4.78–24.2)
GLUCOSE SERPL-MCNC: 143 MG/DL (ref 65–99)
HCT VFR BLD AUTO: 29.4 % (ref 34–46.6)
HGB BLD-MCNC: 9.2 G/DL (ref 12–15.9)
MCH RBC QN AUTO: 30.3 PG (ref 26.6–33)
MCHC RBC AUTO-ENTMCNC: 31.3 G/DL (ref 31.5–35.7)
MCV RBC AUTO: 96.7 FL (ref 79–97)
PLATELET # BLD AUTO: 243 10*3/MM3 (ref 140–450)
PMV BLD AUTO: 11.8 FL (ref 6–12)
POTASSIUM SERPL-SCNC: 4 MMOL/L (ref 3.5–5.2)
RBC # BLD AUTO: 3.04 10*6/MM3 (ref 3.77–5.28)
SODIUM SERPL-SCNC: 143 MMOL/L (ref 136–145)
TSH SERPL DL<=0.05 MIU/L-ACNC: 3.36 UIU/ML (ref 0.27–4.2)
VIT B12 BLD-MCNC: 257 PG/ML (ref 211–946)
WBC NRBC COR # BLD AUTO: 9.7 10*3/MM3 (ref 3.4–10.8)

## 2025-04-18 PROCEDURE — 80048 BASIC METABOLIC PNL TOTAL CA: CPT | Performed by: NURSE PRACTITIONER

## 2025-04-18 PROCEDURE — 36415 COLL VENOUS BLD VENIPUNCTURE: CPT | Performed by: NURSE PRACTITIONER

## 2025-04-18 PROCEDURE — 82746 ASSAY OF FOLIC ACID SERUM: CPT | Performed by: NURSE PRACTITIONER

## 2025-04-18 PROCEDURE — 84443 ASSAY THYROID STIM HORMONE: CPT | Performed by: NURSE PRACTITIONER

## 2025-04-18 PROCEDURE — 82306 VITAMIN D 25 HYDROXY: CPT | Performed by: NURSE PRACTITIONER

## 2025-04-18 PROCEDURE — 82607 VITAMIN B-12: CPT | Performed by: NURSE PRACTITIONER

## 2025-04-18 PROCEDURE — 85027 COMPLETE CBC AUTOMATED: CPT | Performed by: NURSE PRACTITIONER

## 2025-04-23 ENCOUNTER — LAB REQUISITION (OUTPATIENT)
Dept: LAB | Facility: HOSPITAL | Age: 65
End: 2025-04-23
Payer: MEDICARE

## 2025-04-23 DIAGNOSIS — I63.311 CEREBRAL INFARCTION DUE TO THROMBOSIS OF RIGHT MIDDLE CEREBRAL ARTERY: ICD-10-CM

## 2025-04-23 DIAGNOSIS — I67.82 CEREBRAL ISCHEMIA: ICD-10-CM

## 2025-04-23 LAB
BASOPHILS # BLD AUTO: 0.06 10*3/MM3 (ref 0–0.2)
BASOPHILS NFR BLD AUTO: 0.6 % (ref 0–1.5)
DEPRECATED RDW RBC AUTO: 50.4 FL (ref 37–54)
EOSINOPHIL # BLD AUTO: 0.26 10*3/MM3 (ref 0–0.4)
EOSINOPHIL NFR BLD AUTO: 2.7 % (ref 0.3–6.2)
ERYTHROCYTE [DISTWIDTH] IN BLOOD BY AUTOMATED COUNT: 14.3 % (ref 12.3–15.4)
HCT VFR BLD AUTO: 29.3 % (ref 34–46.6)
HGB BLD-MCNC: 9.2 G/DL (ref 12–15.9)
IMM GRANULOCYTES # BLD AUTO: 0.04 10*3/MM3 (ref 0–0.05)
IMM GRANULOCYTES NFR BLD AUTO: 0.4 % (ref 0–0.5)
IRON 24H UR-MRATE: 56 MCG/DL (ref 37–145)
LYMPHOCYTES # BLD AUTO: 1.29 10*3/MM3 (ref 0.7–3.1)
LYMPHOCYTES NFR BLD AUTO: 13.2 % (ref 19.6–45.3)
MCH RBC QN AUTO: 30.6 PG (ref 26.6–33)
MCHC RBC AUTO-ENTMCNC: 31.4 G/DL (ref 31.5–35.7)
MCV RBC AUTO: 97.3 FL (ref 79–97)
MONOCYTES # BLD AUTO: 0.53 10*3/MM3 (ref 0.1–0.9)
MONOCYTES NFR BLD AUTO: 5.4 % (ref 5–12)
NEUTROPHILS NFR BLD AUTO: 7.57 10*3/MM3 (ref 1.7–7)
NEUTROPHILS NFR BLD AUTO: 77.7 % (ref 42.7–76)
NRBC BLD AUTO-RTO: 0 /100 WBC (ref 0–0.2)
PLATELET # BLD AUTO: 237 10*3/MM3 (ref 140–450)
PMV BLD AUTO: 11.7 FL (ref 6–12)
RBC # BLD AUTO: 3.01 10*6/MM3 (ref 3.77–5.28)
WBC NRBC COR # BLD AUTO: 9.75 10*3/MM3 (ref 3.4–10.8)

## 2025-04-23 PROCEDURE — 85025 COMPLETE CBC W/AUTO DIFF WBC: CPT | Performed by: FAMILY MEDICINE

## 2025-04-23 PROCEDURE — 36415 COLL VENOUS BLD VENIPUNCTURE: CPT | Performed by: FAMILY MEDICINE

## 2025-04-23 PROCEDURE — 83540 ASSAY OF IRON: CPT | Performed by: FAMILY MEDICINE

## 2025-04-30 ENCOUNTER — OFFICE VISIT (OUTPATIENT)
Dept: NEUROLOGY | Age: 65
End: 2025-04-30

## 2025-04-30 VITALS
BODY MASS INDEX: 30.73 KG/M2 | SYSTOLIC BLOOD PRESSURE: 130 MMHG | DIASTOLIC BLOOD PRESSURE: 76 MMHG | HEIGHT: 64 IN | WEIGHT: 180 LBS | HEART RATE: 51 BPM | OXYGEN SATURATION: 98 % | RESPIRATION RATE: 16 BRPM

## 2025-04-30 DIAGNOSIS — Z86.73 HISTORY OF RECENT STROKE: Primary | ICD-10-CM

## 2025-04-30 DIAGNOSIS — R41.3 MEMORY LOSS: ICD-10-CM

## 2025-04-30 DIAGNOSIS — I65.21 STENOSIS OF RIGHT CAROTID ARTERY: ICD-10-CM

## 2025-04-30 NOTE — PROGRESS NOTES
REVIEW OF SYSTEMS    Constitutional: []Fever []Sweat []Chills [] Recent Injury [x] Denies all unless marked  HEENT:[]Headache  [] Head Injury/Hearing Loss  [] Sore Throat  [] Ear Ache/Dizziness  [x] Denies all unless marked  Spine:  [] Neck pain  [] Back pain  [] Sciaticia  [x] Denies all unless marked  Cardiovascular:[]Heart Disease []Chest Pain [] Palpitations  [x] Denies all unless marked  Pulmonary: []Shortness of Breath []Cough   [x] Denies all unless marke  Gastrointestinal: []Nausea  []Vomiting  []Abdominal Pain  []Constipation  []Diarrhea  []Dark Bloody Stools  [x] Denies all unless marked  Psychiatric/Behavioral:[] Depression [] Anxiety [x] Denies all unless marked  Genitourinary:   [] Frequency  [] Urgency  [] Incontinence [] Pain with Urination  [x] Denies all unless marked  Extremities: []Pain  []Swelling  [x] Denies all unless marked  Musculoskeletal: [] Muscle Pain  [] Joint Pain  [] Arthritis [] Muscle Cramps [] Muscle Twitches  [x] Denies all unless marked  Sleep: [] Insomnia [] Snoring [] Restless Legs [] Sleep Apnea  [] Daytime Sleepiness  [x] Denies all unless marked  Skin:[] Rash [] Skin Discoloration [x] Denies all unless marked   Neurological: []Visual Disturbance/Memory Loss [] Loss of Balance [] Slurred Speech/Weakness [] Seizures  [] Vertigo/Dizziness [x] Denies all unless marked     
  Procedure Laterality Date    CARDIAC PROCEDURE N/A 4/3/2025    Left heart cath / coronary angiography performed by Janiya Lyons MD at [facility] CARDIAC CATH LAB       Family History   Problem Relation Age of Onset    Cervical Cancer Mother     Heart Disease Father     Schizophrenia Sister        No Known Allergies    Social History     Socioeconomic History    Marital status: Unknown     Spouse name: Not on file    Number of children: Not on file    Years of education: Not on file    Highest education level: Not on file   Occupational History    Not on file   Tobacco Use    Smoking status: Every Day     Current packs/day: 1.50     Types: Cigarettes    Smokeless tobacco: Never   Vaping Use    Vaping status: Never Used   Substance and Sexual Activity    Alcohol use: Never    Drug use: Never    Sexual activity: Not on file   Other Topics Concern    Not on file   Social History Narrative    Not on file     Social Drivers of Health     Financial Resource Strain: Not on file   Food Insecurity: No Food Insecurity (3/30/2025)    Hunger Vital Sign     Worried About Running Out of Food in the Last Year: Never true     Ran Out of Food in the Last Year: Never true   Transportation Needs: No Transportation Needs (3/30/2025)    PRAPARE - Transportation     Lack of Transportation (Medical): No     Lack of Transportation (Non-Medical): No   Physical Activity: Not on file   Stress: Not on file   Social Connections: Unknown (10/11/2023)    [facility]    Family and Community Support     Help with Day-to-Day Activities: Not on file     Lonely or Isolated: Not on file   Intimate Partner Violence: Not At Risk (3/30/2025)    Received from [facility]    Abuse Screen     Feels Unsafe at Home or Work/School: no     Feels Threatened by Someone: no     Does Anyone Try to Keep You From Having Contact with Others or Doing Things Outside Your Home?: no     Physical Signs of

## 2025-05-02 ENCOUNTER — OFFICE VISIT (OUTPATIENT)
Dept: CARDIOLOGY CLINIC | Age: 65
End: 2025-05-02
Payer: MEDICARE

## 2025-05-02 VITALS
HEIGHT: 64 IN | OXYGEN SATURATION: 97 % | DIASTOLIC BLOOD PRESSURE: 60 MMHG | HEART RATE: 53 BPM | SYSTOLIC BLOOD PRESSURE: 102 MMHG | BODY MASS INDEX: 30.9 KG/M2

## 2025-05-02 DIAGNOSIS — I63.9 STROKE DETERMINED BY CLINICAL ASSESSMENT (HCC): ICD-10-CM

## 2025-05-02 DIAGNOSIS — R94.39 ABNORMAL CARDIOVASCULAR STRESS TEST: ICD-10-CM

## 2025-05-02 DIAGNOSIS — I65.21 SYMPTOMATIC STENOSIS OF RIGHT CAROTID ARTERY: ICD-10-CM

## 2025-05-02 DIAGNOSIS — I50.21 ACUTE SYSTOLIC CONGESTIVE HEART FAILURE (HCC): Primary | ICD-10-CM

## 2025-05-02 DIAGNOSIS — I65.22 ASYMPTOMATIC STENOSIS OF LEFT CAROTID ARTERY: ICD-10-CM

## 2025-05-02 PROCEDURE — 99214 OFFICE O/P EST MOD 30 MIN: CPT | Performed by: INTERNAL MEDICINE

## 2025-05-02 PROCEDURE — 1123F ACP DISCUSS/DSCN MKR DOCD: CPT | Performed by: INTERNAL MEDICINE

## 2025-05-05 RX ORDER — METOPROLOL TARTRATE 25 MG/1
12.5 TABLET, FILM COATED ORAL 2 TIMES DAILY
Qty: 60 TABLET | Refills: 0 | Status: SHIPPED | OUTPATIENT
Start: 2025-05-05

## 2025-05-05 ASSESSMENT — ENCOUNTER SYMPTOMS: COUGH: 1

## 2025-05-05 NOTE — PROGRESS NOTES
[facility]  Cardiology Office Note  [address] [place] [address]  [address]  Phone: [phone]  Fax: [phone]                            Date:  5/5/2025  Patient: Sharmin Petersen  Age:  65 y.o., 1960    Referral: No ref. provider found      PROBLEM LIST:    Patient Active Problem List    Diagnosis Date Noted    Acute left-sided weakness 04/01/2025     Priority: High    Acute systolic congestive heart failure (HCC) 04/01/2025     Priority: High    Palliative care patient 04/04/2025     Priority: Low    Cardiomyopathy (HCC) 04/04/2025     Priority: Low    Symptomatic stenosis of right carotid artery 04/02/2025     Priority: Low    Asymptomatic stenosis of left carotid artery 04/02/2025     Priority: Low    Abnormal cardiovascular stress test 04/02/2025     Priority: Low    Stroke determined by clinical assessment ([facility]) 03/30/2025     Priority: Low         PRESENTATION:   History of Present Illness  The patient presents for evaluation of multivessel CAD and peripheral vascular disease. She is accompanied by her daughter and granddaughter.    She reports a general sense of well-being since her discharge from the hospital. Her daily activities include dressing herself and moving around her living area, although she requires a wheelchair for longer distances and support from objects for shorter distances. She resides in a nursing home where she is assisted by staff to mobilize from her bed to a wheelchair for meals. She is unable to stand independently due to weakness on her left side, a residual effect of previous strokes. She has been participating in physical therapy and utilizes a walker for mobility.    MEDICATIONS  Aspirin, Plavix, Lipitor 80 mg, lisinopril 2.5 mg        Imaging:    Echo  03/30/25    ECHO (TTE) COMPLETE (PRN CONTRAST/BUBBLE/STRAIN/3D) 03/31/2025  3:16 PM (Final)    Interpretation Summary    Left Ventricle: Moderately reduced left ventricular systolic  LABS:                        12.0   10.05 )-----------( 264      ( 28 Sep 2021 13:41 )             37.9     09-28    135  |  98  |  46<H>  ----------------------------<  136<H>  4.0   |  21  |  1.7<H>    Ca    8.8      28 Sep 2021 13:41    TPro  6.6  /  Alb  3.5  /  TBili  0.5  /  DBili  x   /  AST  20  /  ALT  11  /  AlkPhos  127<H>  09-28    Troponin T, Serum: <0.01: Hemolyzed. Interpret with caution ng/mL (09.28.21 @ 13:41) Serum Pro-Brain Natriuretic Peptide: 2249 pg/mL (09.28.21 @ 13:41)   Lactate, Blood: 2.2: Elevated lactate.     Blood Gas Profile - Venous (09.28.21 @ 13:41)   pH, Venous: 7.42   pCO2, Venous: 44 mmHg   pO2, Venous: 35 mmHg   HCO3, Venous: 28 mmol/L   Base Excess, Venous: 3.5 mmol/L   Oxygen Saturation, Venous: 53.3 %     12 Lead ECG (09.28.21 @ 14:13) >    Diagnosis Line Atrial fibrillation with premature ventricular or aberrantly conducted  complexes  Rightward axis  Septal infarct , age undetermined  Abnormal ECG      Xray Chest 1 View- PORTABLE-Urgent (09.28.21 @ 14:44)     Impression:    Stable bilateral reticular opacities. No pneumothorax.

## 2025-05-07 ENCOUNTER — LAB REQUISITION (OUTPATIENT)
Dept: LAB | Facility: HOSPITAL | Age: 65
End: 2025-05-07
Payer: MEDICARE

## 2025-05-07 DIAGNOSIS — J44.9 CHRONIC OBSTRUCTIVE PULMONARY DISEASE, UNSPECIFIED: ICD-10-CM

## 2025-05-07 DIAGNOSIS — I67.82 CEREBRAL ISCHEMIA: ICD-10-CM

## 2025-05-07 LAB
ANION GAP SERPL CALCULATED.3IONS-SCNC: 11 MMOL/L (ref 5–15)
BUN SERPL-MCNC: 19 MG/DL (ref 8–23)
BUN/CREAT SERPL: 22.9 (ref 7–25)
CALCIUM SPEC-SCNC: 8.4 MG/DL (ref 8.6–10.5)
CHLORIDE SERPL-SCNC: 107 MMOL/L (ref 98–107)
CO2 SERPL-SCNC: 25 MMOL/L (ref 22–29)
CREAT SERPL-MCNC: 0.83 MG/DL (ref 0.57–1)
EGFRCR SERPLBLD CKD-EPI 2021: 77.9 ML/MIN/1.73
GLUCOSE SERPL-MCNC: 136 MG/DL (ref 65–99)
NT-PROBNP SERPL-MCNC: ABNORMAL PG/ML (ref 0–900)
POTASSIUM SERPL-SCNC: 3.9 MMOL/L (ref 3.5–5.2)
SODIUM SERPL-SCNC: 143 MMOL/L (ref 136–145)

## 2025-05-07 PROCEDURE — 83880 ASSAY OF NATRIURETIC PEPTIDE: CPT | Performed by: FAMILY MEDICINE

## 2025-05-07 PROCEDURE — 80048 BASIC METABOLIC PNL TOTAL CA: CPT | Performed by: FAMILY MEDICINE

## 2025-05-07 PROCEDURE — 36415 COLL VENOUS BLD VENIPUNCTURE: CPT | Performed by: FAMILY MEDICINE

## 2025-05-09 ENCOUNTER — LAB REQUISITION (OUTPATIENT)
Dept: LAB | Facility: HOSPITAL | Age: 65
End: 2025-05-09
Payer: MEDICARE

## 2025-05-09 DIAGNOSIS — I63.311 CEREBRAL INFARCTION DUE TO THROMBOSIS OF RIGHT MIDDLE CEREBRAL ARTERY: ICD-10-CM

## 2025-05-09 DIAGNOSIS — I67.82 CEREBRAL ISCHEMIA: ICD-10-CM

## 2025-05-09 DIAGNOSIS — F03.90 UNSPECIFIED DEMENTIA, UNSPECIFIED SEVERITY, WITHOUT BEHAVIORAL DISTURBANCE, PSYCHOTIC DISTURBANCE, MOOD DISTURBANCE, AND ANXIETY: ICD-10-CM

## 2025-05-09 DIAGNOSIS — I25.10 ATHEROSCLEROTIC HEART DISEASE OF NATIVE CORONARY ARTERY WITHOUT ANGINA PECTORIS: ICD-10-CM

## 2025-05-09 DIAGNOSIS — I42.9 CARDIOMYOPATHY, UNSPECIFIED: ICD-10-CM

## 2025-05-09 DIAGNOSIS — E11.9 TYPE 2 DIABETES MELLITUS WITHOUT COMPLICATIONS: ICD-10-CM

## 2025-05-09 LAB
ANION GAP SERPL CALCULATED.3IONS-SCNC: 10 MMOL/L (ref 5–15)
BUN SERPL-MCNC: 19 MG/DL (ref 8–23)
BUN/CREAT SERPL: 20 (ref 7–25)
CALCIUM SPEC-SCNC: 8.7 MG/DL (ref 8.6–10.5)
CHLORIDE SERPL-SCNC: 102 MMOL/L (ref 98–107)
CO2 SERPL-SCNC: 30 MMOL/L (ref 22–29)
CREAT SERPL-MCNC: 0.95 MG/DL (ref 0.57–1)
EGFRCR SERPLBLD CKD-EPI 2021: 66.2 ML/MIN/1.73
GLUCOSE SERPL-MCNC: 128 MG/DL (ref 65–99)
NT-PROBNP SERPL-MCNC: ABNORMAL PG/ML (ref 0–900)
POTASSIUM SERPL-SCNC: 3.5 MMOL/L (ref 3.5–5.2)
SODIUM SERPL-SCNC: 142 MMOL/L (ref 136–145)

## 2025-05-09 PROCEDURE — 80048 BASIC METABOLIC PNL TOTAL CA: CPT | Performed by: NURSE PRACTITIONER

## 2025-05-09 PROCEDURE — 83880 ASSAY OF NATRIURETIC PEPTIDE: CPT | Performed by: NURSE PRACTITIONER

## 2025-05-09 PROCEDURE — 36415 COLL VENOUS BLD VENIPUNCTURE: CPT | Performed by: NURSE PRACTITIONER

## 2025-05-12 ENCOUNTER — LAB REQUISITION (OUTPATIENT)
Dept: LAB | Facility: HOSPITAL | Age: 65
End: 2025-05-12
Payer: MEDICARE

## 2025-05-12 DIAGNOSIS — E11.9 TYPE 2 DIABETES MELLITUS WITHOUT COMPLICATIONS: ICD-10-CM

## 2025-05-12 DIAGNOSIS — I50.21 ACUTE SYSTOLIC (CONGESTIVE) HEART FAILURE: ICD-10-CM

## 2025-05-12 DIAGNOSIS — J44.9 CHRONIC OBSTRUCTIVE PULMONARY DISEASE, UNSPECIFIED: ICD-10-CM

## 2025-05-12 DIAGNOSIS — I25.10 ATHEROSCLEROTIC HEART DISEASE OF NATIVE CORONARY ARTERY WITHOUT ANGINA PECTORIS: ICD-10-CM

## 2025-05-12 LAB
ANION GAP SERPL CALCULATED.3IONS-SCNC: 11 MMOL/L (ref 5–15)
BUN SERPL-MCNC: 21 MG/DL (ref 8–23)
BUN/CREAT SERPL: 22.8 (ref 7–25)
CALCIUM SPEC-SCNC: 8.5 MG/DL (ref 8.6–10.5)
CHLORIDE SERPL-SCNC: 101 MMOL/L (ref 98–107)
CO2 SERPL-SCNC: 29 MMOL/L (ref 22–29)
CREAT SERPL-MCNC: 0.92 MG/DL (ref 0.57–1)
EGFRCR SERPLBLD CKD-EPI 2021: 68.8 ML/MIN/1.73
GLUCOSE SERPL-MCNC: 133 MG/DL (ref 65–99)
POTASSIUM SERPL-SCNC: 3.7 MMOL/L (ref 3.5–5.2)
SODIUM SERPL-SCNC: 141 MMOL/L (ref 136–145)

## 2025-05-12 PROCEDURE — 36415 COLL VENOUS BLD VENIPUNCTURE: CPT | Performed by: FAMILY MEDICINE

## 2025-05-12 PROCEDURE — 80048 BASIC METABOLIC PNL TOTAL CA: CPT | Performed by: FAMILY MEDICINE

## 2025-07-14 ENCOUNTER — PATIENT MESSAGE (OUTPATIENT)
Age: 65
End: 2025-07-14

## 2025-08-05 ENCOUNTER — TELEPHONE (OUTPATIENT)
Dept: CARDIOLOGY CLINIC | Age: 65
End: 2025-08-05

## 2025-08-08 ENCOUNTER — OFFICE VISIT (OUTPATIENT)
Dept: CARDIOLOGY CLINIC | Age: 65
End: 2025-08-08
Payer: MEDICARE

## 2025-08-08 VITALS
HEART RATE: 51 BPM | BODY MASS INDEX: 30.9 KG/M2 | SYSTOLIC BLOOD PRESSURE: 102 MMHG | HEIGHT: 64 IN | DIASTOLIC BLOOD PRESSURE: 60 MMHG

## 2025-08-08 DIAGNOSIS — I65.22 ASYMPTOMATIC STENOSIS OF LEFT CAROTID ARTERY: Primary | ICD-10-CM

## 2025-08-08 DIAGNOSIS — I50.21 ACUTE SYSTOLIC CONGESTIVE HEART FAILURE (HCC): ICD-10-CM

## 2025-08-08 DIAGNOSIS — R94.39 ABNORMAL CARDIOVASCULAR STRESS TEST: ICD-10-CM

## 2025-08-08 DIAGNOSIS — I63.9 STROKE DETERMINED BY CLINICAL ASSESSMENT (HCC): ICD-10-CM

## 2025-08-08 PROCEDURE — 99214 OFFICE O/P EST MOD 30 MIN: CPT | Performed by: INTERNAL MEDICINE

## 2025-08-08 PROCEDURE — 1123F ACP DISCUSS/DSCN MKR DOCD: CPT | Performed by: INTERNAL MEDICINE

## 2025-08-08 PROCEDURE — 1159F MED LIST DOCD IN RCRD: CPT | Performed by: INTERNAL MEDICINE

## 2025-08-08 RX ORDER — ADHESIVE TAPE 3"X 2.3 YD
500 TAPE, NON-MEDICATED TOPICAL EVERY MORNING
COMMUNITY

## 2025-08-08 RX ORDER — MEMANTINE HYDROCHLORIDE 10 MG/1
10 TABLET ORAL DAILY
COMMUNITY

## 2025-08-08 RX ORDER — FUROSEMIDE 20 MG/1
20 TABLET ORAL DAILY
COMMUNITY

## 2025-08-08 RX ORDER — LORATADINE 10 MG/1
10 TABLET ORAL DAILY PRN
COMMUNITY
Start: 2025-07-25

## 2025-08-08 RX ORDER — ESCITALOPRAM OXALATE 5 MG/1
5 TABLET ORAL DAILY
COMMUNITY

## 2025-08-08 RX ORDER — LANOLIN ALCOHOL/MO/W.PET/CERES
1000 CREAM (GRAM) TOPICAL DAILY
COMMUNITY
Start: 2025-06-18

## 2025-08-08 RX ORDER — ERGOCALCIFEROL 1.25 MG/1
50000 CAPSULE, LIQUID FILLED ORAL
COMMUNITY
Start: 2025-07-14

## 2025-08-08 RX ORDER — DOCUSATE SODIUM 50MG AND SENNOSIDES 8.6MG 8.6; 5 MG/1; MG/1
2 TABLET, FILM COATED ORAL
COMMUNITY
Start: 2025-06-18

## 2025-08-08 RX ORDER — TRAZODONE HYDROCHLORIDE 100 MG/1
100 TABLET ORAL NIGHTLY
COMMUNITY
Start: 2025-02-25

## (undated) DEVICE — KIT ANGIO W/ AT P65 PREM HND CTRL FOR CNTRST DEL ANGIOTOUCH

## (undated) DEVICE — PINNACLE INTRODUCER SHEATH: Brand: PINNACLE

## (undated) DEVICE — CATHETER ANGIO 5FR L110CM DIA0.047IN VENT PIG IMPLS

## (undated) DEVICE — BAND COMPR L24CM REG CLR PLAS HEMSTAT EXT HK AND LOOP RETEN

## (undated) DEVICE — GUIDEWIRE VASC J 3 MM 0.035 INX260 CM 10 CM PTFE SS STRT

## (undated) DEVICE — CATHETER DIAG 5FR L100CM LUMN ID0.047IN JR4 CRV 0 SIDE H

## (undated) DEVICE — KIT MFLD ISOLATN NACL CNTRST PRT TBNG SPIK W/ PRSS TRNSDUC

## (undated) DEVICE — Device

## (undated) DEVICE — GLIDESHEATH SLENDER STAINLESS STEEL KIT: Brand: GLIDESHEATH SLENDER

## (undated) DEVICE — HI-TORQUE BALANCE MIDDLEWEIGHT UNIVERSAL GUIDE WIRE .014 STRAIGHT TIP 3.0 CM X 190 CM: Brand: HI-TORQUE BALANCE MIDDLEWEIGHT UNIVERSAL

## (undated) DEVICE — CATHETER DIAG 5FR L100CM LUMN ID0.047IN JL4 CRV 0 SIDE H

## (undated) DEVICE — Device: Brand: NOMOLINE™ LH ADULT NASAL CO2 CANNULA WITH O2 4M